# Patient Record
Sex: FEMALE | Race: WHITE | NOT HISPANIC OR LATINO | Employment: FULL TIME | URBAN - METROPOLITAN AREA
[De-identification: names, ages, dates, MRNs, and addresses within clinical notes are randomized per-mention and may not be internally consistent; named-entity substitution may affect disease eponyms.]

---

## 2017-07-10 ENCOUNTER — OFFICE VISIT (OUTPATIENT)
Dept: FAMILY MEDICINE | Facility: CLINIC | Age: 22
End: 2017-07-10
Payer: COMMERCIAL

## 2017-07-10 VITALS
HEART RATE: 90 BPM | HEIGHT: 65 IN | DIASTOLIC BLOOD PRESSURE: 73 MMHG | BODY MASS INDEX: 20.23 KG/M2 | SYSTOLIC BLOOD PRESSURE: 110 MMHG | OXYGEN SATURATION: 98 % | WEIGHT: 121.4 LBS | TEMPERATURE: 98.5 F

## 2017-07-10 DIAGNOSIS — Z11.3 SCREEN FOR STD (SEXUALLY TRANSMITTED DISEASE): ICD-10-CM

## 2017-07-10 DIAGNOSIS — Z30.011 OCP (ORAL CONTRACEPTIVE PILLS) INITIATION: ICD-10-CM

## 2017-07-10 DIAGNOSIS — Z00.00 ROUTINE GENERAL MEDICAL EXAMINATION AT A HEALTH CARE FACILITY: Primary | ICD-10-CM

## 2017-07-10 DIAGNOSIS — Z13.39 ATTENTION DEFICIT HYPERACTIVITY DISORDER EVALUATION: ICD-10-CM

## 2017-07-10 PROCEDURE — 87491 CHLMYD TRACH DNA AMP PROBE: CPT | Performed by: PHYSICIAN ASSISTANT

## 2017-07-10 PROCEDURE — 99213 OFFICE O/P EST LOW 20 MIN: CPT | Mod: 25 | Performed by: PHYSICIAN ASSISTANT

## 2017-07-10 PROCEDURE — 99395 PREV VISIT EST AGE 18-39: CPT | Performed by: PHYSICIAN ASSISTANT

## 2017-07-10 PROCEDURE — 87591 N.GONORRHOEAE DNA AMP PROB: CPT | Performed by: PHYSICIAN ASSISTANT

## 2017-07-10 RX ORDER — NORETHINDRONE ACETATE AND ETHINYL ESTRADIOL .02; 1 MG/1; MG/1
1 TABLET ORAL DAILY
Qty: 84 TABLET | Refills: 3 | Status: SHIPPED | OUTPATIENT
Start: 2017-07-10 | End: 2019-09-18

## 2017-07-10 NOTE — MR AVS SNAPSHOT
After Visit Summary   7/10/2017    Yue Carbone    MRN: [id number]           Patient Information     Date Of Birth          1995        Visit Information        Provider Department      7/10/2017 9:15 AM Aaseby-Aguilera, Ramona Ann, PA-C [facility]        Today's Diagnoses     Routine general medical examination at a health care facility    -  1    Attention deficit hyperactivity disorder evaluation        OCP (oral contraceptive pills) initiation        Screen for STD (sexually transmitted disease)          Care Instructions    ADHD eval:  Follow-up with [place] mental health for assessment then come back to me      The use of the oral contraceptive has been fully discussed with the patient. This includes the proper method to initiate (i.e. Sunday start after next normal menstrual onset) and continue the pills, the need for regular compliance to ensure adequate contraceptive effect, the physiology which make the pill effective, the instructions for what to do in event of a missed pill, and warnings about anticipated minor side effects such as breakthrough spotting, nausea, breast tenderness, weight changes, acne, headaches, etc.  The patient has been told of the more serious potential side effects such as MI, stroke, and deep vein thrombosis, all of which are very unlikely.  She has been asked to report any signs of such serious problems immediately.  She should back up the pill with a condom during any cycle in which antibiotics are prescribed, and during the first cycle as well.  She has been given written literature regarding use and side effects of oral contraceptives. The need for additional protection, such as a condom, to prevent exposure to sexually transmitted diseases has also been discussed- the patient has been clearly reminded that OCP's cannot protect her against diseases such as HIV and others. She understands and wishes to take the medication as  prescribed          Preventive Health Recommendations  Female Ages 18 to 25     Yearly exam:     See your health care provider every year in order to  o Review health changes.   o Discuss preventive care.    o Review your medicines if your doctor has prescribed any.      You should be tested each year for STDs (sexually transmitted diseases).       After age 20, talk to your provider about how often you should have cholesterol testing.      Starting at age 21, get a Pap test every three years. If you have an abnormal result, your doctor may have you test more often.      If you are at risk for diabetes, you should have a diabetes test (fasting glucose).     Shots:     Get a flu shot each year.     Get a tetanus shot every 10 years.     Consider getting the shot (vaccine) that prevents cervical cancer (Gardasil).    Nutrition:     Eat at least 5 servings of fruits and vegetables each day.    Eat whole-grain bread, whole-wheat pasta and brown rice instead of white grains and rice.    Talk to your provider about Calcium and Vitamin D.     Lifestyle    Exercise at least 150 minutes a week each week (30 minutes a day, 5 days a week). This will help you control your weight and prevent disease.    Limit alcohol to one drink per day.    No smoking.     Wear sunscreen to prevent skin cancer.    See your dentist every six months for an exam and cleaning.          Follow-ups after your visit        Additional Services     MENTAL HEALTH REFERRAL       Your provider has referred you to: [facility]: [facility] - ADHD & Bariatric Assessments - Adult ADHD Evaluations - [facility] - [place] [phone]   [email or web address]   *Please call to schedule an appointment.    All scheduling is subject to the client's specific insurance plan & benefits, provider/location availability, and provider clinical specialities.  Please arrive 15 minutes early for your first  "appointment and bring your completed paperwork.    Please be aware that coverage of these services is subject to the terms and limitations of your health insurance plan.  Call member services at your health plan with any benefit or coverage questions.                  Who to contact     If you have questions or need follow up information about today's clinic visit or your schedule please contact [facility] directly at [phone].  Normal or non-critical lab and imaging results will be communicated to you by MyChart, letter or phone within 4 business days after the clinic has received the results. If you do not hear from us within 7 days, please contact the clinic through [employer] or phone. If you have a critical or abnormal lab result, we will notify you by phone as soon as possible.  Submit refill requests through [employer] or call your pharmacy and they will forward the refill request to us. Please allow 3 business days for your refill to be completed.          Additional Information About Your Visit        [employer] Information     [employer] lets you send messages to your doctor, view your test results, renew your prescriptions, schedule appointments and more. To sign up, go to [email or web address] . Click on \"Log in\" on the left side of the screen, which will take you to the Welcome page. Then click on \"Sign up Now\" on the right side of the page.     You will be asked to enter the access code listed below, as well as some personal information. Please follow the directions to create your username and password.     Your access code is: [email or web address]  Expires: 10/8/2017  9:40 AM     Your access code will  in 90 days. If you need help or a new code, please call your [facility] or [phone].        Care EveryWhere ID     This is your Care EveryWhere ID. This could be used by other organizations to access your [facility] medical records  [id number]        Your Vitals Were     Pulse " "Temperature Height Last Period Pulse Oximetry BMI (Body Mass Index)    90 98.5  F (36.9  C) (Oral) 5' 5\" (1.651 m) 07/10/2017 98% 20.2 kg/m2       Blood Pressure from Last 3 Encounters:   07/10/17 110/73   06/24/16 109/70   03/15/16 98/60    Weight from Last 3 Encounters:   07/10/17 121 lb 6.4 oz (55.1 kg)   06/24/16 120 lb (54.4 kg) (36 %)*   03/15/16 119 lb 4.8 oz (54.1 kg) (35 %)*     * Growth percentiles are based on [facility] 2-20 Years data.              We Performed the Following     CHLAMYDIA TRACHOMATIS PCR     MENTAL HEALTH REFERRAL     NEISSERIA GONORRHOEA PCR          Today's Medication Changes          These changes are accurate as of: 7/10/17  9:40 AM.  If you have any questions, ask your nurse or doctor.               Start taking these medicines.        Dose/Directions    norethindrone-ethinyl estradiol 1-20 MG-MCG per tablet   Commonly known as:  MICROGESTIN 1/20   Used for:  OCP (oral contraceptive pills) initiation   Started by:  Aaseby-Aguilera, Ramona Ann, PA-C        Dose:  1 tablet   Take 1 tablet by mouth daily   Quantity:  84 tablet   Refills:  3            Where to get your medicines      These medications were sent to [facility]/pharmacy #[id number] - [place], [place] - [address] [address] [facility]  [address] [address] [address]    Hours:  [facility] Phone:  [phone]     norethindrone-ethinyl estradiol 1-20 MG-MCG per tablet                Primary Care Provider Office Phone # Fax #    Naz Catherine [phone]       [facility] [address]  [facility] [address]        Equal Access to Services     SONIA ORDONEZ AH: Heide Amaya, wadanelle luidris, qaybta kaalmonae hickey, nirav bonner. So [facility] [phone].    ATENCIÓN: Si habla español, tiene a mccoy disposición servicios gratuitos de asistencia lingüística. Llame al [phone].    We comply with applicable federal civil rights laws and [place] laws. We " do not discriminate on the basis of race, color, national origin, age, disability sex, sexual orientation or gender identity.            Thank you!     Thank you for choosing [facility]  for your care. Our goal is always to provide you with excellent care. Hearing back from our patients is one way we can continue to improve our services. Please take a few minutes to complete the written survey that you may receive in the mail after your visit with us. Thank you!             Your Updated Medication List - Protect others around you: Learn how to safely use, store and throw away your medicines at www.disposemymeds.org.          This list is accurate as of: 7/10/17  9:40 AM.  Always use your most recent med list.                   Brand Name Dispense Instructions for use Diagnosis    norethindrone-ethinyl estradiol 1-20 MG-MCG per tablet    MICROGESTIN 1/20    84 tablet    Take 1 tablet by mouth daily    OCP (oral contraceptive pills) initiation

## 2017-07-10 NOTE — PROGRESS NOTES
SUBJECTIVE:   CC: Yue Carbone is an 22 year old woman who presents for preventive health visit.     Healthy Habits:    Do you get at least three servings of calcium containing foods daily (dairy, green leafy vegetables, etc.)? sometimes    Amount of exercise or daily activities, outside of work: none    Problems taking medications regularly not applicable    Medication side effects: No    Have you had an eye exam in the past two years? yes    Do you see a dentist twice per year? yes    Do you have sleep apnea, excessive snoring or daytime drowsiness?no        Discuss birth control /  Wants to get tested for add     Today's PHQ-2 Score:   PHQ-2 ( 1999 [employer]) 1/13/2016 1/13/2016   Q1: Little interest or pleasure in doing things 0 0   Q2: Feeling down, depressed or hopeless 0 0   PHQ-2 Score 0 0       Abuse: Current or Past(Physical, Sexual or Emotional)- No  Do you feel safe in your environment - Yes    Social History   Substance Use Topics     Smoking status: Never Smoker     Smokeless tobacco: Never Used     Alcohol use 0.0 oz/week     0 Standard drinks or equivalent per week      Comment: occ wine     social     Reviewed orders with patient.  Reviewed health maintenance and updated orders accordingly - Yes  Labs reviewed in [facility]    Mammogram not appropriate for this patient based on age.    Pertinent mammograms are reviewed under the imaging tab.  History of abnormal Pap smear: NO - under age 21, PAP not appropriate for age    Reviewed and updated as needed this visit by clinical staff  Tobacco  Allergies  Meds  Med Hx  Surg Hx  Fam Hx  Soc Hx        Reviewed and updated as needed this visit by Provider              ROS:  C: NEGATIVE for fever, chills, change in weight  I: NEGATIVE for worrisome rashes, moles or lesions  E: NEGATIVE for vision changes or irritation  ENT: NEGATIVE for ear, mouth and throat problems  R: NEGATIVE for significant cough or SOB  B: NEGATIVE for masses, tenderness or  "discharge  CV: NEGATIVE for chest pain, palpitations or peripheral edema  GI: NEGATIVE for nausea, abdominal pain, heartburn, or change in bowel habits  : NEGATIVE for unusual urinary or vaginal symptoms. Periods are regular.  M: NEGATIVE for significant arthralgias or myalgia  N: NEGATIVE for weakness, dizziness or paresthesias  P: NEGATIVE for changes in mood or affect    OBJECTIVE:   /73 (BP Location: Right arm, Patient Position: Chair, Cuff Size: Adult Regular)  Pulse 90  Temp 98.5  F (36.9  C) (Oral)  Ht 5' 5\" (1.651 m)  Wt 121 lb 6.4 oz (55.1 kg)  LMP 07/10/2017  SpO2 98%  BMI 20.2 kg/m2  EXAM:  GENERAL: healthy, alert and no distress  EYES: Eyes grossly normal to inspection, PERRL and conjunctivae and sclerae normal  HENT: ear canals and TM's normal, nose and mouth without ulcers or lesions  NECK: no adenopathy, no asymmetry, masses, or scars and thyroid normal to palpation  RESP: lungs clear to auscultation - no rales, rhonchi or wheezes  BREAST: normal without masses, tenderness or nipple discharge and no palpable axillary masses or adenopathy  CV: regular rate and rhythm, normal S1 S2, no S3 or S4, no murmur, click or rub, no peripheral edema and peripheral pulses strong  ABDOMEN: soft, nontender, no hepatosplenomegaly, no masses and bowel sounds normal  MS: no gross musculoskeletal defects noted, no edema  SKIN: no suspicious lesions or rashes  NEURO: Normal strength and tone, mentation intact and speech normal  PSYCH: mentation appears normal, affect normal/bright    ASSESSMENT/PLAN:   1. Routine general medical examination at a health care facility      2. Attention deficit hyperactivity disorder evaluation  Yue states she would like to discuss ADHD symptoims.  She has always had a difficult time focusing and high school was hard for her. Is going back to college this fall and needs help. Well refer her to Mental health for  Evaluation.  Signs and symptoms discussed as well as various " "medications and treatment  - MENTAL HEALTH REFERRAL    3. OCP (oral contraceptive pills) initiation  The use of the oral contraceptive has been fully discussed with the patient. This includes the proper method to initiate (i.e. Sunday start after next normal menstrual onset) and continue the pills, the need for regular compliance to ensure adequate contraceptive effect, the physiology which make the pill effective, the instructions for what to do in event of a missed pill, and warnings about anticipated minor side effects such as breakthrough spotting, nausea, breast tenderness, weight changes, acne, headaches, etc.  The patient has been told of the more serious potential side effects such as MI, stroke, and deep vein thrombosis, all of which are very unlikely.  She has been asked to report any signs of such serious problems immediately.  She should back up the pill with a condom during any cycle in which antibiotics are prescribed, and during the first cycle as well.    - norethindrone-ethinyl estradiol (MICROGESTIN 1/20) 1-20 MG-MCG per tablet; Take 1 tablet by mouth daily  Dispense: 84 tablet; Refill: 3    4. Screen for STD (sexually transmitted disease)    - NEISSERIA GONORRHOEA PCR  - CHLAMYDIA TRACHOMATIS PCR    COUNSELING:   Reviewed preventive health counseling, as reflected in patient instructions       Regular exercise       Healthy diet/nutrition       Contraception       Safe sex practices/STD prevention    BP Screening:   Last 3 BP Readings:    BP Readings from Last 3 Encounters:   07/10/17 110/73   06/24/16 109/70   03/15/16 98/60          reports that she has never smoked. She has never used smokeless tobacco.    Estimated body mass index is 20.2 kg/(m^2) as calculated from the following:    Height as of this encounter: 5' 5\" (1.651 m).    Weight as of this encounter: 121 lb 6.4 oz (55.1 kg).         Counseling Resources:  ATP IV Guidelines  Pooled Cohorts Equation Calculator  Breast Cancer Risk " Calculator  FRAX Risk Assessment  ICSI Preventive Guidelines  Dietary Guidelines for Americans, 2010  [employer]'s MyPlate  ASA Prophylaxis  Lung CA Screening    Ramona Ann Aaseby-Aguilera, PA-C    Patient Instructions   MITESH samson:  Follow-up with [place] mental [facility] for assessment then come back to me      The use of the oral contraceptive has been fully discussed with the patient. This includes the proper method to initiate (i.e. Sunday start after next normal menstrual onset) and continue the pills, the need for regular compliance to ensure adequate contraceptive effect, the physiology which make the pill effective, the instructions for what to do in event of a missed pill, and warnings about anticipated minor side effects such as breakthrough spotting, nausea, breast tenderness, weight changes, acne, headaches, etc.  The patient has been told of the more serious potential side effects such as MI, stroke, and deep vein thrombosis, all of which are very unlikely.  She has been asked to report any signs of such serious problems immediately.  She should back up the pill with a condom during any cycle in which antibiotics are prescribed, and during the first cycle as well.  She has been given written literature regarding use and side effects of oral contraceptives. The need for additional protection, such as a condom, to prevent exposure to sexually transmitted diseases has also been discussed- the patient has been clearly reminded that OCP's cannot protect her against diseases such as HIV and others. She understands and wishes to take the medication as prescribed          Preventive Health Recommendations  Female Ages 18 to 25     Yearly exam:     See your health care provider every year in order to  o Review health changes.   o Discuss preventive care.    o Review your medicines if your doctor has prescribed any.      You should be tested each year for STDs (sexually transmitted diseases).       After age 20, talk to your  provider about how often you should have cholesterol testing.      Starting at age 21, get a Pap test every three years. If you have an abnormal result, your doctor may have you test more often.      If you are at risk for diabetes, you should have a diabetes test (fasting glucose).     Shots:     Get a flu shot each year.     Get a tetanus shot every 10 years.     Consider getting the shot (vaccine) that prevents cervical cancer (Gardasil).    Nutrition:     Eat at least 5 servings of fruits and vegetables each day.    Eat whole-grain bread, whole-wheat pasta and brown rice instead of white grains and rice.    Talk to your provider about Calcium and Vitamin D.     Lifestyle    Exercise at least 150 minutes a week each week (30 minutes a day, 5 days a week). This will help you control your weight and prevent disease.    Limit alcohol to one drink per day.    No smoking.     Wear sunscreen to prevent skin cancer.    See your dentist every six months for an exam and cleaning.    [facility]

## 2017-07-10 NOTE — NURSING NOTE
"Chief Complaint   Patient presents with     Physical       Initial /73 (BP Location: Right arm, Patient Position: Chair, Cuff Size: Adult Regular)  Pulse 90  Temp 98.5  F (36.9  C) (Oral)  Ht 5' 5\" (1.651 m)  Wt 121 lb 6.4 oz (55.1 kg)  LMP 07/10/2017  SpO2 98%  BMI 20.2 kg/m2 Estimated body mass index is 20.2 kg/(m^2) as calculated from the following:    Height as of this encounter: 5' 5\" (1.651 m).    Weight as of this encounter: 121 lb 6.4 oz (55.1 kg).  Medication Reconciliation: complete Nilda Velásquez CMA        "

## 2017-07-10 NOTE — PATIENT INSTRUCTIONS
ADHD lynnal:  Follow-up with [place] mental [facility] for assessment then come back to me      The use of the oral contraceptive has been fully discussed with the patient. This includes the proper method to initiate (i.e. Sunday start after next normal menstrual onset) and continue the pills, the need for regular compliance to ensure adequate contraceptive effect, the physiology which make the pill effective, the instructions for what to do in event of a missed pill, and warnings about anticipated minor side effects such as breakthrough spotting, nausea, breast tenderness, weight changes, acne, headaches, etc.  The patient has been told of the more serious potential side effects such as MI, stroke, and deep vein thrombosis, all of which are very unlikely.  She has been asked to report any signs of such serious problems immediately.  She should back up the pill with a condom during any cycle in which antibiotics are prescribed, and during the first cycle as well.  She has been given written literature regarding use and side effects of oral contraceptives. The need for additional protection, such as a condom, to prevent exposure to sexually transmitted diseases has also been discussed- the patient has been clearly reminded that OCP's cannot protect her against diseases such as HIV and others. She understands and wishes to take the medication as prescribed          Preventive Health Recommendations  Female Ages 18 to 25     Yearly exam:     See your health care provider every year in order to  o Review health changes.   o Discuss preventive care.    o Review your medicines if your doctor has prescribed any.      You should be tested each year for STDs (sexually transmitted diseases).       After age 20, talk to your provider about how often you should have cholesterol testing.      Starting at age 21, get a Pap test every three years. If you have an abnormal result, your doctor may have you test more often.      If you are at  risk for diabetes, you should have a diabetes test (fasting glucose).     Shots:     Get a flu shot each year.     Get a tetanus shot every 10 years.     Consider getting the shot (vaccine) that prevents cervical cancer (Gardasil).    Nutrition:     Eat at least 5 servings of fruits and vegetables each day.    Eat whole-grain bread, whole-wheat pasta and brown rice instead of white grains and rice.    Talk to your provider about Calcium and Vitamin D.     Lifestyle    Exercise at least 150 minutes a week each week (30 minutes a day, 5 days a week). This will help you control your weight and prevent disease.    Limit alcohol to one drink per day.    No smoking.     Wear sunscreen to prevent skin cancer.    See your dentist every six months for an exam and cleaning.

## 2017-07-11 ENCOUNTER — TELEPHONE (OUTPATIENT)
Dept: FAMILY MEDICINE | Facility: CLINIC | Age: 22
End: 2017-07-11

## 2017-07-11 DIAGNOSIS — A74.9 CHLAMYDIA INFECTION: Primary | ICD-10-CM

## 2017-07-11 LAB
C TRACH DNA SPEC QL NAA+PROBE: ABNORMAL
N GONORRHOEA DNA SPEC QL NAA+PROBE: NORMAL
SPECIMEN SOURCE: ABNORMAL
SPECIMEN SOURCE: NORMAL

## 2017-07-11 RX ORDER — AZITHROMYCIN 500 MG/1
1000 TABLET, FILM COATED ORAL ONCE
Qty: 2 TABLET | Refills: 0 | Status: SHIPPED | OUTPATIENT
Start: 2017-07-11 | End: 2017-07-11

## 2017-07-11 NOTE — TELEPHONE ENCOUNTER
"Pt is very surprised by results of test -  States she has only been with one person for the last year and has not been sexually active for the last several months.     Pt does report \"having more sex before I met my boyfriend\" and now worries about possible other STD's     Please advise as to other STD testing and RX for Chlamydia    Pt advised can have injection without symptoms.   Needs to notify sexual partners.   Must abstain from sex for full 7 days until both are treated.   Should practice safe sex/condoms at all times.       MDH reporting done.     Per PCP send RX zithromax-  Pt should schedule OV to discuss further STD testing.     Pt expressed understanding and acceptance of the plan.  Pt had no further questions at this time.  Advised can call back to clinic at any time with concerns.       Message handled by Nurse Triage with [facility] - provider name: Ramona Aaseby Aguliea PA.    Sienna Taylor RN    "

## 2018-04-28 ENCOUNTER — HEALTH MAINTENANCE LETTER (OUTPATIENT)
Age: 23
End: 2018-04-28

## 2018-07-28 ENCOUNTER — HEALTH MAINTENANCE LETTER (OUTPATIENT)
Age: 23
End: 2018-07-28

## 2018-08-18 ENCOUNTER — HEALTH MAINTENANCE LETTER (OUTPATIENT)
Age: 23
End: 2018-08-18

## 2019-09-18 ENCOUNTER — OFFICE VISIT (OUTPATIENT)
Dept: FAMILY MEDICINE | Facility: CLINIC | Age: 24
End: 2019-09-18
Payer: COMMERCIAL

## 2019-09-18 VITALS
SYSTOLIC BLOOD PRESSURE: 116 MMHG | HEIGHT: 55 IN | RESPIRATION RATE: 16 BRPM | OXYGEN SATURATION: 99 % | BODY MASS INDEX: 26.04 KG/M2 | WEIGHT: 112.5 LBS | TEMPERATURE: 98.1 F | HEART RATE: 73 BPM | DIASTOLIC BLOOD PRESSURE: 72 MMHG

## 2019-09-18 DIAGNOSIS — F32.9 REACTIVE DEPRESSION: ICD-10-CM

## 2019-09-18 DIAGNOSIS — F41.1 GAD (GENERALIZED ANXIETY DISORDER): Primary | ICD-10-CM

## 2019-09-18 PROCEDURE — 99214 OFFICE O/P EST MOD 30 MIN: CPT | Performed by: PHYSICIAN ASSISTANT

## 2019-09-18 RX ORDER — CITALOPRAM HYDROBROMIDE 10 MG/1
10 TABLET ORAL DAILY
Qty: 60 TABLET | Refills: 1 | Status: SHIPPED | OUTPATIENT
Start: 2019-09-18 | End: 2019-10-29

## 2019-09-18 ASSESSMENT — ANXIETY QUESTIONNAIRES
6. BECOMING EASILY ANNOYED OR IRRITABLE: NEARLY EVERY DAY
7. FEELING AFRAID AS IF SOMETHING AWFUL MIGHT HAPPEN: MORE THAN HALF THE DAYS
IF YOU CHECKED OFF ANY PROBLEMS ON THIS QUESTIONNAIRE, HOW DIFFICULT HAVE THESE PROBLEMS MADE IT FOR YOU TO DO YOUR WORK, TAKE CARE OF THINGS AT HOME, OR GET ALONG WITH OTHER PEOPLE: VERY DIFFICULT
1. FEELING NERVOUS, ANXIOUS, OR ON EDGE: NEARLY EVERY DAY
2. NOT BEING ABLE TO STOP OR CONTROL WORRYING: NEARLY EVERY DAY
GAD7 TOTAL SCORE: 20
3. WORRYING TOO MUCH ABOUT DIFFERENT THINGS: NEARLY EVERY DAY
5. BEING SO RESTLESS THAT IT IS HARD TO SIT STILL: NEARLY EVERY DAY

## 2019-09-18 ASSESSMENT — MIFFLIN-ST. JEOR: SCORE: 1088.61

## 2019-09-18 ASSESSMENT — PATIENT HEALTH QUESTIONNAIRE - PHQ9
SUM OF ALL RESPONSES TO PHQ QUESTIONS 1-9: 19
5. POOR APPETITE OR OVEREATING: NEARLY EVERY DAY

## 2019-09-18 NOTE — PROGRESS NOTES
Subjective     Yue Carbone is a 24 year old female who presents to clinic today for the following health issues:    HPI   Abnormal Mood Symptoms  Onset: 2 weeks     Description:   Depression: YES  Anxiety: YES    Accompanying Signs & Symptoms:  Still participating in activities that you used to enjoy: YES  Fatigue: YES  Irritability: yes  Difficulty concentrating: YES  Changes in appetite: YES- eating less  Problems with sleep: YES- hard sleeping   Heart racing/beating fast : YES  Thoughts of hurting yourself or others: none    History:   Recent stress: YES- moved out state   Prior depression hospitalization: None  Family history of depression: no   Family history of anxiety: YES    Precipitating factors:   Alcohol/drug use: some drinks wine     The patient just moved from [place] due to trouble with her last job. She notes she felt like she had to move back to [place] due to this. She has a boyfriend who still lives in [place]. She notes since moving back she has had increased anxiety and depression. She has had several panic attacks per mother. The patient is starting a new job this week which she is excited about.   She is currently living with roommates and her cat, whom she loves.    Patient notes she is constantly anxious and irritable. She is having trouble falling asleep and wakes throughout the night. She denies any suicidal ideation.    PHQ-9: 19  BRADLEY-7: 20    There is no problem list on file for this patient.      No current outpatient medications on file.     No current facility-administered medications for this visit.        Allergies   Allergen Reactions     Seasonal Allergies        Reviewed and updated as needed this visit by Provider  Tobacco  Allergies  Meds  Problems         Review of Systems   GENERAL:  No fevers  PSYCH: As noted in HPI      Objective    /72 (BP Location: Right arm, Patient Position: Chair, Cuff Size: Adult Regular)   Pulse 73   Temp 98.1  F (36.7  C)   Resp 16   " Ht 1.359 m (4' 5.5\")   Wt 51 kg (112 lb 8 oz)   SpO2 99%   BMI 27.63 kg/m    Body mass index is 27.63 kg/m .  Physical Exam   GENERAL: No acute distress  HEENT: Normocephalic  NEURO: Alert and non-focal  PSYCH: Anxious affect, irritable        Assessment & Plan   1. BRADLEY (generalized anxiety disorder)  Patient's mother has been on Celexa previously. Patient has never been on medications for anxiety/depression. Mother has tolerated the Celexa well. At this time patient would like to forgo therapy (she notes when she has done therapy in the past she had a difficult time talking).   We discussed common side effects of Celexa. Given the degree of her anxiety and depression I did opt to increase the dose to 20 mg within a week. Follow up in 1 month. Patient will schedule this at a later date, likely in [place] (closer to where she lives).  - citalopram (CELEXA) 10 MG tablet; Take 1 tablet (10 mg) by mouth daily Increase to 2 tablets daily there after  Dispense: 60 tablet; Refill: 1    2. Reactive depression  As noted above.  - citalopram (CELEXA) 10 MG tablet; Take 1 tablet (10 mg) by mouth daily Increase to 2 tablets daily there after  Dispense: 60 tablet; Refill: 1    Patient Instructions   Follow up in 1 month.     Take Celexa as prescribed.      Return in about 1 month (around 10/18/2019).    Daniela Mcnair PA-C  [facility]"

## 2019-09-19 ASSESSMENT — ANXIETY QUESTIONNAIRES: GAD7 TOTAL SCORE: 20

## 2019-10-29 ENCOUNTER — OFFICE VISIT (OUTPATIENT)
Dept: FAMILY MEDICINE | Facility: CLINIC | Age: 24
End: 2019-10-29
Payer: COMMERCIAL

## 2019-10-29 VITALS
DIASTOLIC BLOOD PRESSURE: 86 MMHG | RESPIRATION RATE: 16 BRPM | BODY MASS INDEX: 27.02 KG/M2 | SYSTOLIC BLOOD PRESSURE: 127 MMHG | OXYGEN SATURATION: 99 % | TEMPERATURE: 98.6 F | WEIGHT: 110 LBS | HEART RATE: 96 BPM

## 2019-10-29 DIAGNOSIS — F41.1 GAD (GENERALIZED ANXIETY DISORDER): Primary | ICD-10-CM

## 2019-10-29 DIAGNOSIS — F32.9 REACTIVE DEPRESSION: ICD-10-CM

## 2019-10-29 PROCEDURE — 99214 OFFICE O/P EST MOD 30 MIN: CPT | Performed by: PHYSICIAN ASSISTANT

## 2019-10-29 RX ORDER — LORAZEPAM 0.5 MG/1
0.5 TABLET ORAL 2 TIMES DAILY PRN
Qty: 10 TABLET | Refills: 1 | Status: SHIPPED | OUTPATIENT
Start: 2019-10-29 | End: 2019-12-30

## 2019-10-29 RX ORDER — DESVENLAFAXINE 25 MG/1
TABLET, EXTENDED RELEASE ORAL
Qty: 60 TABLET | Refills: 1 | Status: SHIPPED | OUTPATIENT
Start: 2019-10-29 | End: 2019-12-21

## 2019-10-29 ASSESSMENT — ANXIETY QUESTIONNAIRES
2. NOT BEING ABLE TO STOP OR CONTROL WORRYING: NEARLY EVERY DAY
7. FEELING AFRAID AS IF SOMETHING AWFUL MIGHT HAPPEN: MORE THAN HALF THE DAYS
1. FEELING NERVOUS, ANXIOUS, OR ON EDGE: NEARLY EVERY DAY
IF YOU CHECKED OFF ANY PROBLEMS ON THIS QUESTIONNAIRE, HOW DIFFICULT HAVE THESE PROBLEMS MADE IT FOR YOU TO DO YOUR WORK, TAKE CARE OF THINGS AT HOME, OR GET ALONG WITH OTHER PEOPLE: EXTREMELY DIFFICULT
6. BECOMING EASILY ANNOYED OR IRRITABLE: NEARLY EVERY DAY
3. WORRYING TOO MUCH ABOUT DIFFERENT THINGS: NEARLY EVERY DAY
5. BEING SO RESTLESS THAT IT IS HARD TO SIT STILL: NEARLY EVERY DAY
GAD7 TOTAL SCORE: 20

## 2019-10-29 ASSESSMENT — PATIENT HEALTH QUESTIONNAIRE - PHQ9
SUM OF ALL RESPONSES TO PHQ QUESTIONS 1-9: 21
5. POOR APPETITE OR OVEREATING: NEARLY EVERY DAY

## 2019-10-29 NOTE — PROGRESS NOTES
Subjective     Yue Carbone is a 24 year old female who presents to clinic today for the following health issues:    HPI   Anxiety Follow-Up    How are you doing with your anxiety since your last visit? Worsened loss of appitate and more anxious     Are you having other symptoms that might be associated with anxiety? Yes:  panic attack     Have you had a significant life event? No     Are you feeling depressed? Yes:  worsening    Do you have any concerns with your use of alcohol or other drugs? No    Yue is a 24-year-old female who presents today for follow-up regarding anxiety and depression.  The patient was seen approximately 1 month ago and started on Celexa.  She has since stopped the medication about a month ago due to decreased appetite and increased anxiety.  Patient notes she has had increased irritability.  She is having some tension with her roommates.  Currently she is enjoying her job but is commuting quite far.    Of note patient does indicate she has had some passive thoughts of suicidal ideation.  She denies any plan.  She notes after stopping the Celexa she felt very down and was having trouble seeing beyond the days that were bad days.  This has improved recently.  The patient notes she is still very close with her boyfriend in [place] and calls him frequently.  She is also close with her mother who she feels comfortable reaching out to.    Patient has had more anxiety attacks and panic attacks since stopping the Celexa as well.    Social History     Tobacco Use     Smoking status: Never Smoker     Smokeless tobacco: Never Used   Substance Use Topics     Alcohol use: Yes     Alcohol/week: 0.0 standard drinks     Comment: occ wine     Drug use: No     PHQ-9: 21  BRADLEY-7: 20    Reviewed and updated as needed this visit by Provider  Tobacco  Allergies  Meds  Problems         Review of Systems   GENERAL:  No fevers  PSYCH: As noted in HPI        Objective    /86 (BP Location: Right arm,  Patient Position: Chair, Cuff Size: Adult Regular)   Pulse 96   Temp 98.6  F (37  C) (Oral)   Resp 16   Wt 49.9 kg (110 lb)   SpO2 99%   BMI 27.02 kg/m    Body mass index is 27.02 kg/m .  Physical Exam   GENERAL: No acute distress  HEENT: Normocephalic  NEURO: Alert and non-focal          Assessment & Plan     1. BRADLEY (generalized anxiety disorder)  Patient is still interested in medication options.  We discussed the numerous options available however she has been on Zoloft previously and did not like side effects.  I am concerned fluoxetine may worsen her anxiety.  She did not tolerate the Celexa and thus I do not feel Lexapro is a good option.  Effexor is not preferred by her insurance and thus patient started on Pristiq instead.  I recommended she start at 25 mg daily and increase to 50 mg after 1 week.  She is requesting an as needed medication for panic attacks.  She was prescribed lorazepam 0.5 mg to use up to twice daily as needed.  I did caution patient regarding the addictive nature of this medication and recommended she not use it more than twice weekly.  I also recommended she not drink alcohol with the Ativan.  I do believe patient would benefit from therapy to help with coping mechanisms for anxiety and to decrease her frequency of panic attacks.  She was in agreement to do this.  Referral placed for her today.  Again patient was having passive thoughts of suicidal ideation.  She denies having a plan.  I recommended she reach out if she begins to have more thoughts or concerns for her safety.  - MENTAL HEALTH REFERRAL  - Adult; Outpatient Treatment; Individual/Couples/Family/Group Therapy/Health Psychology; Other: [facility] [phone]; We will contact you to schedule the appointment or please call with any questi...  - desvenlafaxine (PRISTIQ) 25 MG 24 hr tablet; 1 tablet daily for 1 week and increase to 2 tablets daily thereafter  Dispense: 60 tablet; Refill: 1  -  LORazepam (ATIVAN) 0.5 MG tablet; Take 1 tablet (0.5 mg) by mouth 2 times daily as needed for anxiety  Dispense: 10 tablet; Refill: 1    2. Reactive depression  As noted above.  - MENTAL HEALTH REFERRAL  - Adult; Outpatient Treatment; Individual/Couples/Family/Group Therapy/Health Psychology; Other: [facility] [phone]; We will contact you to schedule the appointment or please call with any questi...  - desvenlafaxine (PRISTIQ) 25 MG 24 hr tablet; 1 tablet daily for 1 week and increase to 2 tablets daily thereafter  Dispense: 60 tablet; Refill: 1    Return in about 1 month (around 11/29/2019) for for follow up anxiety/depression.    30 minutes. Greater than 50% of the time was spent face to face counseling regarding medical conditions and treatment options as described above.     Daniela Mcnair PA-C  [facility]

## 2019-10-30 ASSESSMENT — ANXIETY QUESTIONNAIRES: GAD7 TOTAL SCORE: 20

## 2019-12-02 ENCOUNTER — TELEPHONE (OUTPATIENT)
Dept: FAMILY MEDICINE | Facility: CLINIC | Age: 24
End: 2019-12-02

## 2019-12-02 NOTE — TELEPHONE ENCOUNTER
Summary:    Patient is due/failing the following:   chlamydia and PAP    Reviewed:  [x] CARE EVERYWHERE  [x] LAST OV NOTE INCLUDING ENDO  [x] FYI TAB  [x] MYCHART ACTIVE?  [x] LAST PANEL ENCOUNTER  [x] FUTURE APPTS  [x] IMMUNIZATIONS          Action needed:   Patient needs office visit for pap.    Type of outreach:    pt has future appt on 12/05/2019 with Dr. WILTON Vaca/[facility]

## 2019-12-21 ENCOUNTER — OFFICE VISIT (OUTPATIENT)
Dept: FAMILY MEDICINE | Facility: CLINIC | Age: 24
End: 2019-12-21
Payer: COMMERCIAL

## 2019-12-21 VITALS
DIASTOLIC BLOOD PRESSURE: 67 MMHG | TEMPERATURE: 98.3 F | RESPIRATION RATE: 16 BRPM | HEART RATE: 92 BPM | BODY MASS INDEX: 28.25 KG/M2 | SYSTOLIC BLOOD PRESSURE: 100 MMHG | WEIGHT: 115 LBS | OXYGEN SATURATION: 99 %

## 2019-12-21 DIAGNOSIS — F32.9 REACTIVE DEPRESSION: ICD-10-CM

## 2019-12-21 DIAGNOSIS — F41.1 GAD (GENERALIZED ANXIETY DISORDER): ICD-10-CM

## 2019-12-21 PROCEDURE — 99214 OFFICE O/P EST MOD 30 MIN: CPT | Performed by: PHYSICIAN ASSISTANT

## 2019-12-21 RX ORDER — DESVENLAFAXINE 25 MG/1
75 TABLET, EXTENDED RELEASE ORAL DAILY
Qty: 90 TABLET | Refills: 1 | Status: SHIPPED | OUTPATIENT
Start: 2019-12-21 | End: 2020-12-14

## 2019-12-21 ASSESSMENT — ANXIETY QUESTIONNAIRES
GAD7 TOTAL SCORE: 20
1. FEELING NERVOUS, ANXIOUS, OR ON EDGE: NEARLY EVERY DAY
3. WORRYING TOO MUCH ABOUT DIFFERENT THINGS: NEARLY EVERY DAY
5. BEING SO RESTLESS THAT IT IS HARD TO SIT STILL: NEARLY EVERY DAY
7. FEELING AFRAID AS IF SOMETHING AWFUL MIGHT HAPPEN: MORE THAN HALF THE DAYS
GAD7 TOTAL SCORE: 20
2. NOT BEING ABLE TO STOP OR CONTROL WORRYING: NEARLY EVERY DAY
4. TROUBLE RELAXING: NEARLY EVERY DAY
GAD7 TOTAL SCORE: 20
6. BECOMING EASILY ANNOYED OR IRRITABLE: NEARLY EVERY DAY
7. FEELING AFRAID AS IF SOMETHING AWFUL MIGHT HAPPEN: MORE THAN HALF THE DAYS

## 2019-12-21 ASSESSMENT — PATIENT HEALTH QUESTIONNAIRE - PHQ9
SUM OF ALL RESPONSES TO PHQ QUESTIONS 1-9: 21
SUM OF ALL RESPONSES TO PHQ QUESTIONS 1-9: 21
10. IF YOU CHECKED OFF ANY PROBLEMS, HOW DIFFICULT HAVE THESE PROBLEMS MADE IT FOR YOU TO DO YOUR WORK, TAKE CARE OF THINGS AT HOME, OR GET ALONG WITH OTHER PEOPLE: VERY DIFFICULT

## 2019-12-21 NOTE — PROGRESS NOTES
Subjective     Yue Carbone is a 24 year old female who presents to clinic today for the following health issues:    History of Present Illness        Mental Health Follow-up:  Patient presents to follow-up on Depression & Anxiety.Patient's depression since last visit has been:  Medium  The patient is having other symptoms associated with depression.  Patient's anxiety since last visit has been:  No change  The patient is having other symptoms associated with anxiety.  Any significant life events: other  Patient is feeling anxious or having panic attacks.  Patient has no concerns about alcohol or drug use.     Social History  Tobacco Use    Smoking status: Never Smoker    Smokeless tobacco: Never Used  Alcohol use: Yes    Alcohol/week: 0.0 standard drinks    Comment: occ wine  Drug use: No      Today's PHQ-9         PHQ-9 Total Score:     (P) 21   PHQ-9 Q9 Thoughts of better off dead/self-harm past 2 weeks :   (P) Not at all   Thoughts of suicide or self harm:      Self-harm Plan:        Self-harm Action:          Safety concerns for self or others:                  Current Outpatient Medications   Medication Sig Dispense Refill     desvenlafaxine succinate (PRISTIQ) 25 MG 24 hr tablet Take 3 tablets (75 mg) by mouth daily 90 tablet 1     LORazepam (ATIVAN) 0.5 MG tablet Take 1 tablet (0.5 mg) by mouth 2 times daily as needed for anxiety 10 tablet 1     No lab results found.   BP Readings from Last 3 Encounters:   12/21/19 100/67   10/29/19 127/86   09/18/19 116/72    Wt Readings from Last 3 Encounters:   12/21/19 52.2 kg (115 lb)   10/29/19 49.9 kg (110 lb)   09/18/19 51 kg (112 lb 8 oz)                    Reviewed and updated as needed this visit by Provider         Review of Systems   ROS COMP: Constitutional, HEENT, cardiovascular, pulmonary, gi and gu systems are negative, except as otherwise noted.      Objective    /67 (BP Location: Right arm, Patient Position: Sitting, Cuff Size: Adult Regular)   " Pulse 92   Temp 98.3  F (36.8  C) (Oral)   Resp 16   Wt 52.2 kg (115 lb)   SpO2 99%   BMI 28.25 kg/m    Body mass index is 28.25 kg/m .  Physical Exam   GENERAL: healthy, alert and no distress  HENT: ear canals and TM's normal, nose and mouth without ulcers or lesions  RESP: lungs clear to auscultation - no rales, rhonchi or wheezes  CV: regular rate and rhythm, normal S1 S2, no S3 or S4, no murmur, click or rub, no peripheral edema and peripheral pulses strong  SKIN: no suspicious lesions or rashes    Diagnostic Test Results:  Labs reviewed in Epic        Assessment & Plan     1. BRADLEY (generalized anxiety disorder)  Has been stable on this for a while.  Needs refills   - desvenlafaxine succinate (PRISTIQ) 25 MG 24 hr tablet; Take 3 tablets (75 mg) by mouth daily  Dispense: 90 tablet; Refill: 1    2. Reactive depression    - desvenlafaxine succinate (PRISTIQ) 25 MG 24 hr tablet; Take 3 tablets (75 mg) by mouth daily  Dispense: 90 tablet; Refill: 1     BMI:   Estimated body mass index is 28.25 kg/m  as calculated from the following:    Height as of 9/18/19: 1.359 m (4' 5.5\").    Weight as of this encounter: 52.2 kg (115 lb).           Patient Instructions   (F41.1) BRADLEY (generalized anxiety disorder)  Comment:   Plan: desvenlafaxine succinate (PRISTIQ) 25 MG 24 hr         tablet          (F32.9) Reactive depression  Comment:   Plan: desvenlafaxine succinate (PRISTIQ) 25 MG 24 hr         tablet      Was started on proistiq 2 months ago.  States she likes it due to not having significant side effects.  Has been taking 50 mg daily.  Still has significant  Anxiety and depression.  Well increase to 75 mg daily and advised to follow-up in 1 month     Recommend getting thyroid tested but she well would like to wait until she comes in for physical in the next month       No follow-ups on file.    Ramona Ann Aaseby-Aguilera, PA-C  [facility]    Answers for HPI/ROS submitted by the patient on 12/21/2019 "   Chronic problems general questions HPI Form  If you checked off any problems, how difficult have these problems made it for you to do your work, take care of things at home, or get along with other people?: Very difficult  PHQ9 TOTAL SCORE: 21  BRADLEY 7 TOTAL SCORE: 20

## 2019-12-21 NOTE — PATIENT INSTRUCTIONS
(F41.1) BRADLEY (generalized anxiety disorder)  Comment:   Plan: desvenlafaxine succinate (PRISTIQ) 25 MG 24 hr         tablet          (F32.9) Reactive depression  Comment:   Plan: desvenlafaxine succinate (PRISTIQ) 25 MG 24 hr         tablet      Was started on proistiq 2 months ago.  States she likes it due to not having significant side effects.  Has been taking 50 mg daily.  Still has significant  Anxiety and depression.  Well increase to 75 mg daily and advised to follow-up in 1 month     Recommend getting thyroid tested but she well would like to wait until she comes in for physical in the next month

## 2019-12-22 ASSESSMENT — ANXIETY QUESTIONNAIRES: GAD7 TOTAL SCORE: 20

## 2019-12-30 DIAGNOSIS — F41.1 GAD (GENERALIZED ANXIETY DISORDER): ICD-10-CM

## 2020-01-08 ENCOUNTER — TELEPHONE (OUTPATIENT)
Dept: FAMILY MEDICINE | Facility: CLINIC | Age: 25
End: 2020-01-08

## 2020-01-08 NOTE — TELEPHONE ENCOUNTER
Reason for call:  Medication   If this is a refill request, has the caller requested the refill from the pharmacy already? N/A  Will the patient be using a [facility]? N/A  Name of the pharmacy and phone number for the current request: on file    Name of the medication requested: LORazepam (ATIVAN) 0.5 MG tablet    desvenlafaxine succinate (PRISTIQ) 25 MG 24 hr tablet    Other request: Would like a refill on both medications. Patient indicated that they would not like to come back to see Dr. Aaseby-Aguilera  Phone number to reach patient:  Cell number on file:    Telephone Information:   Mobile [phone]       Best Time:  n/a    Can we leave a detailed message on this number?  Not Applicable

## 2020-01-09 NOTE — TELEPHONE ENCOUNTER
Pt advised should not need RF at this time.       She states she does not but is worried as the lorazepam did not have a RF on it.     Advised RF are not usually given with controlled substances.   She is due for f/u this month and if she does not want to see Lindsey then needs to schedule with another provider.   SABINA Mcnair is a provider she can establish care with    .Pt expressed understanding and acceptance of the plan.  Pt had no further questions at this time.  Advised can call back to clinic at any time with concerns.       Sienna Taylor RN

## 2020-02-24 DIAGNOSIS — F41.1 GAD (GENERALIZED ANXIETY DISORDER): ICD-10-CM

## 2020-02-26 NOTE — TELEPHONE ENCOUNTER
LMTRC    Pt needs an appt.  If she does not want to see Lindsey again then she needs to see another provider.  This was relayed to her on 1/9/2020.    Beatriz Calderon RN, BSN

## 2020-02-28 RX ORDER — LORAZEPAM 0.5 MG/1
0.5 TABLET ORAL 2 TIMES DAILY PRN
Qty: 10 TABLET | Refills: 0 | OUTPATIENT
Start: 2020-02-28

## 2020-12-14 ENCOUNTER — OFFICE VISIT (OUTPATIENT)
Dept: FAMILY MEDICINE | Facility: CLINIC | Age: 25
End: 2020-12-14
Payer: COMMERCIAL

## 2020-12-14 VITALS
HEART RATE: 105 BPM | HEIGHT: 65 IN | DIASTOLIC BLOOD PRESSURE: 79 MMHG | WEIGHT: 120 LBS | BODY MASS INDEX: 19.99 KG/M2 | OXYGEN SATURATION: 100 % | TEMPERATURE: 99.6 F | SYSTOLIC BLOOD PRESSURE: 118 MMHG

## 2020-12-14 DIAGNOSIS — N76.0 BV (BACTERIAL VAGINOSIS): ICD-10-CM

## 2020-12-14 DIAGNOSIS — R35.0 URINARY FREQUENCY: Primary | ICD-10-CM

## 2020-12-14 DIAGNOSIS — B96.89 BV (BACTERIAL VAGINOSIS): ICD-10-CM

## 2020-12-14 DIAGNOSIS — R31.29 MICROSCOPIC HEMATURIA: ICD-10-CM

## 2020-12-14 LAB
ALBUMIN UR-MCNC: NEGATIVE MG/DL
APPEARANCE UR: CLEAR
BACTERIA #/AREA URNS HPF: ABNORMAL /HPF
BILIRUB UR QL STRIP: NEGATIVE
COLOR UR AUTO: YELLOW
GLUCOSE UR STRIP-MCNC: NEGATIVE MG/DL
HCG UR QL: NEGATIVE
HGB UR QL STRIP: ABNORMAL
KETONES UR STRIP-MCNC: NEGATIVE MG/DL
LEUKOCYTE ESTERASE UR QL STRIP: NEGATIVE
Lab: ABNORMAL
NITRATE UR QL: NEGATIVE
NON-SQ EPI CELLS #/AREA URNS LPF: ABNORMAL /LPF
PH UR STRIP: 5.5 PH (ref 5–7)
RBC #/AREA URNS AUTO: ABNORMAL /HPF
SOURCE: ABNORMAL
SP GR UR STRIP: >1.03 (ref 1–1.03)
SPECIMEN SOURCE: ABNORMAL
UROBILINOGEN UR STRIP-ACNC: 0.2 EU/DL (ref 0.2–1)
WBC #/AREA URNS AUTO: ABNORMAL /HPF
WET PREP SPEC: ABNORMAL

## 2020-12-14 PROCEDURE — 81025 URINE PREGNANCY TEST: CPT | Performed by: NURSE PRACTITIONER

## 2020-12-14 PROCEDURE — 99214 OFFICE O/P EST MOD 30 MIN: CPT | Performed by: NURSE PRACTITIONER

## 2020-12-14 PROCEDURE — 87086 URINE CULTURE/COLONY COUNT: CPT | Performed by: NURSE PRACTITIONER

## 2020-12-14 PROCEDURE — 81001 URINALYSIS AUTO W/SCOPE: CPT | Performed by: NURSE PRACTITIONER

## 2020-12-14 PROCEDURE — 87591 N.GONORRHOEAE DNA AMP PROB: CPT | Performed by: NURSE PRACTITIONER

## 2020-12-14 PROCEDURE — 87210 SMEAR WET MOUNT SALINE/INK: CPT | Performed by: NURSE PRACTITIONER

## 2020-12-14 PROCEDURE — 87491 CHLMYD TRACH DNA AMP PROBE: CPT | Performed by: NURSE PRACTITIONER

## 2020-12-14 RX ORDER — METRONIDAZOLE 500 MG/1
500 TABLET ORAL 2 TIMES DAILY
Qty: 14 TABLET | Refills: 0 | Status: SHIPPED | OUTPATIENT
Start: 2020-12-14 | End: 2020-12-21

## 2020-12-14 ASSESSMENT — MIFFLIN-ST. JEOR: SCORE: 1292.26

## 2020-12-14 NOTE — LETTER
December 17, 2020      Yue Carbone  [address] [place] [address]        Dear ,      These are all of the results, Yue.  The only significant positive result is the one indicating that you have bacterial vaginosis and you should hopefully be doing better on the flagyl.   This does not however explain the finding of blood in your urine.   I would like you to follow up with your regular doctor in order to get a referral to a urologist to have a cystoscopy to determine whether or not you have any lesions in the urinary tract that would be causing you to have blood in your urine and urinary frequency.   Please call me if you have any questions or need additional help.   I have also forwarded this letter to Dr Catherine.    Resulted Orders   UA with Microscopic reflex to Culture   Result Value Ref Range    Color Urine Yellow     Appearance Urine Clear     Glucose Urine Negative NEG^Negative mg/dL    Bilirubin Urine Negative NEG^Negative    Ketones Urine Negative NEG^Negative mg/dL    Specific Gravity Urine >1.030 1.003 - 1.035    pH Urine 5.5 5.0 - 7.0 pH    Protein Albumin Urine Negative NEG^Negative mg/dL    Urobilinogen Urine 0.2 0.2 - 1.0 EU/dL    Nitrite Urine Negative NEG^Negative    Blood Urine Moderate (A) NEG^Negative    Leukocyte Esterase Urine Negative NEG^Negative    Source Midstream Urine     WBC Urine 0 - 5 OTO5^0 - 5 /HPF    RBC Urine O - 2 OTO2^O - 2 /HPF    Squamous Epithelial /LPF Urine Few FEW^Few /LPF    Bacteria Urine Many (A) NEG^Negative /HPF   HCG Qual, Urine ([id number])   Result Value Ref Range    HCG Qual Urine Negative NEG^Negative      Comment:      This test is for screening purposes.  Results should be interpreted along with   the clinical picture.  Confirmation testing is available if warranted by   ordering [id number], HCG Quantitative Pregnancy.     NEISSERIA GONORRHOEA PCR   Result Value Ref Range    Specimen Descrip Urine     N Gonorrhea PCR Negative NEG^Negative       Comment:      Negative for N. gonorrhoeae rRNA by transcription mediated amplification.  A negative result by transcription mediated amplification does not preclude   the presence of N. gonorrhoeae infection because results are dependent on   proper and adequate collection, absence of inhibitors, and sufficient rRNA to   be detected.     CHLAMYDIA TRACHOMATIS PCR   Result Value Ref Range    Specimen Description Urine     Chlamydia Trachomatis PCR Negative NEG^Negative      Comment:      Negative for C. trachomatis rRNA by transcription mediated amplification.  A negative result by transcription mediated amplification does not preclude   the presence of C. trachomatis infection because results are dependent on   proper and adequate collection, absence of inhibitors, and sufficient rRNA to   be detected.     Wet prep   Result Value Ref Range    Specimen Description Vagina     Special Requests Vagina     Wet Prep No Trichomonas seen     Wet Prep Clue cells seen  Few   (A)     Wet Prep WBC'S seen  Few       Wet Prep No yeast seen    Urine Culture Aerobic Bacterial   Result Value Ref Range    Specimen Description Midstream Urine     Culture Micro       10,000 to 50,000 colonies/mL  mixed urogenital cheikh  Susceptibility testing not routinely done       Sincerely,      MALLORIE Jay CNP

## 2020-12-14 NOTE — PROGRESS NOTES
"Subjective     Yue Carbone is a 25 year old female who presents to clinic today for the following health issues:    HPI         Chief Complaint   Patient presents with     Urinary Problem     frequency, pelvic cramping, nausea, regular periods. Had UA labs done thru her employer, brought results with her.  No way of knowing exactly when her LMP was but roughly when she had ua with microscopic hematuria  Last sexual encounter 11/6/20  Has hx of urinary incontinence   Voiding a couple of times an hour for past 2 weeks  No pain on voiding, no discharge ,  Variable amounts  No family hx diabetes       Review of Systems   Constitutional, HEENT, cardiovascular, pulmonary, gi and gu systems are negative, except as otherwise noted.      Objective    /79 (BP Location: Right arm, Cuff Size: Adult Regular)   Pulse 105   Temp 99.6  F (37.6  C) (Temporal)   Ht 1.638 m (5' 4.5\")   Wt 54.4 kg (120 lb)   SpO2 100%   Breastfeeding No   BMI 20.28 kg/m    Body mass index is 20.28 kg/m .  Physical Exam   GENERAL: healthy, alert and no distress      Results for orders placed or performed in visit on 12/14/20   UA with Microscopic reflex to Culture     Status: Abnormal    Specimen: Midstream Urine   Result Value Ref Range    Color Urine Yellow     Appearance Urine Clear     Glucose Urine Negative NEG^Negative mg/dL    Bilirubin Urine Negative NEG^Negative    Ketones Urine Negative NEG^Negative mg/dL    Specific Gravity Urine >1.030 1.003 - 1.035    pH Urine 5.5 5.0 - 7.0 pH    Protein Albumin Urine Negative NEG^Negative mg/dL    Urobilinogen Urine 0.2 0.2 - 1.0 EU/dL    Nitrite Urine Negative NEG^Negative    Blood Urine Moderate (A) NEG^Negative    Leukocyte Esterase Urine Negative NEG^Negative    Source Midstream Urine     WBC Urine 0 - 5 OTO5^0 - 5 /HPF    RBC Urine O - 2 OTO2^O - 2 /HPF    Squamous Epithelial /LPF Urine Few FEW^Few /LPF    Bacteria Urine Many (A) NEG^Negative /HPF   HCG Qual, Urine ([id number])     " Status: None   Result Value Ref Range    HCG Qual Urine Negative NEG^Negative   NEISSERIA GONORRHOEA PCR     Status: None    Specimen: Urine   Result Value Ref Range    Specimen Descrip Urine     N Gonorrhea PCR Negative NEG^Negative   CHLAMYDIA TRACHOMATIS PCR     Status: None    Specimen: Urine   Result Value Ref Range    Specimen Description Urine     Chlamydia Trachomatis PCR Negative NEG^Negative   Wet prep     Status: Abnormal    Specimen: Vagina   Result Value Ref Range    Specimen Description Vagina     Special Requests Vagina     Wet Prep No Trichomonas seen     Wet Prep Clue cells seen  Few   (A)     Wet Prep WBC'S seen  Few       Wet Prep No yeast seen    Urine Culture Aerobic Bacterial     Status: None    Specimen: Midstream Urine   Result Value Ref Range    Specimen Description Midstream Urine     Culture Micro       10,000 to 50,000 colonies/mL  mixed urogenital cheikh  Susceptibility testing not routinely done             Assessment & Plan     (R35.0) Urinary frequency  (primary encounter diagnosis)  Plan: UA with Microscopic reflex to Culture, HCG         Qual, Urine ([id number]), NEISSERIA GONORRHOEA         PCR, CHLAMYDIA TRACHOMATIS PCR, Wet prep       (N76.0,  B96.89) BV (bacterial vaginosis)  Plan: metroNIDAZOLE (FLAGYL) 500 MG tablet          (R31.29) Microscopic hematuria  Plan: Urine Culture Aerobic Bacterial      I have reported resutls to Yue with a recommendation to follow up with her PCP after tx for BV to further evaluate the hematuria  She may need urology referral with cystoscopy if hematuria persists.  I did also forward the message to MALLORIE Wasserman CNP  [facility]

## 2020-12-16 LAB
BACTERIA SPEC CULT: NORMAL
C TRACH DNA SPEC QL NAA+PROBE: NEGATIVE
N GONORRHOEA DNA SPEC QL NAA+PROBE: NEGATIVE
SPECIMEN SOURCE: NORMAL

## 2020-12-17 NOTE — RESULT ENCOUNTER NOTE
These are all of the results, Yue.  The only significant positive result is the one indicating that you have bacterial vaginosis and you should hopefully be doing better on the flagyl.  This does not however explain the finding of blood in your urine.  I would like you to follow up with your regular doctor in order to get a referral to a urologist to have a cystoscopy to determine whether or not you have any lesions in the urinary tract that would be causing you to have blood in your urine and urinary frequency.  Please call me if you have any questions or need additional help   I have also forwarded this letter to Dr Catherine

## 2021-01-29 ENCOUNTER — AMBULATORY - HEALTHEAST (OUTPATIENT)
Dept: NURSING | Facility: CLINIC | Age: 26
End: 2021-01-29

## 2021-02-22 ENCOUNTER — AMBULATORY - HEALTHEAST (OUTPATIENT)
Dept: NURSING | Facility: CLINIC | Age: 26
End: 2021-02-22

## 2021-08-11 ENCOUNTER — OFFICE VISIT (OUTPATIENT)
Dept: PEDIATRICS | Facility: CLINIC | Age: 26
End: 2021-08-11
Payer: COMMERCIAL

## 2021-08-11 VITALS
HEIGHT: 65 IN | RESPIRATION RATE: 16 BRPM | TEMPERATURE: 98.9 F | SYSTOLIC BLOOD PRESSURE: 104 MMHG | WEIGHT: 116.7 LBS | HEART RATE: 104 BPM | BODY MASS INDEX: 19.44 KG/M2 | DIASTOLIC BLOOD PRESSURE: 62 MMHG | OXYGEN SATURATION: 97 %

## 2021-08-11 DIAGNOSIS — F32.A DEPRESSION, UNSPECIFIED DEPRESSION TYPE: ICD-10-CM

## 2021-08-11 DIAGNOSIS — R11.2 NAUSEA AND VOMITING, INTRACTABILITY OF VOMITING NOT SPECIFIED, UNSPECIFIED VOMITING TYPE: ICD-10-CM

## 2021-08-11 DIAGNOSIS — R53.83 FATIGUE, UNSPECIFIED TYPE: Primary | ICD-10-CM

## 2021-08-11 DIAGNOSIS — Z30.9 ENCOUNTER FOR CONTRACEPTIVE MANAGEMENT, UNSPECIFIED TYPE: ICD-10-CM

## 2021-08-11 DIAGNOSIS — G89.29 CHRONIC ABDOMINAL PAIN: ICD-10-CM

## 2021-08-11 DIAGNOSIS — R30.0 DYSURIA: ICD-10-CM

## 2021-08-11 DIAGNOSIS — F41.9 ANXIETY: ICD-10-CM

## 2021-08-11 DIAGNOSIS — R10.9 CHRONIC ABDOMINAL PAIN: ICD-10-CM

## 2021-08-11 LAB
ALBUMIN UR-MCNC: NEGATIVE MG/DL
APPEARANCE UR: CLEAR
BACTERIA #/AREA URNS HPF: ABNORMAL /HPF
BASOPHILS # BLD AUTO: 0 10E3/UL (ref 0–0.2)
BASOPHILS NFR BLD AUTO: 0 %
BILIRUB UR QL STRIP: NEGATIVE
COLOR UR AUTO: YELLOW
EOSINOPHIL # BLD AUTO: 0.1 10E3/UL (ref 0–0.7)
EOSINOPHIL NFR BLD AUTO: 1 %
ERYTHROCYTE [DISTWIDTH] IN BLOOD BY AUTOMATED COUNT: 11.3 % (ref 10–15)
ERYTHROCYTE [SEDIMENTATION RATE] IN BLOOD BY WESTERGREN METHOD: 9 MM/HR (ref 0–20)
GLUCOSE UR STRIP-MCNC: NEGATIVE MG/DL
HCG UR QL: NEGATIVE
HCT VFR BLD AUTO: 41.6 % (ref 35–47)
HGB BLD-MCNC: 14 G/DL (ref 11.7–15.7)
HGB UR QL STRIP: ABNORMAL
KETONES UR STRIP-MCNC: 40 MG/DL
LEUKOCYTE ESTERASE UR QL STRIP: NEGATIVE
LYMPHOCYTES # BLD AUTO: 1.7 10E3/UL (ref 0.8–5.3)
LYMPHOCYTES NFR BLD AUTO: 15 %
MCH RBC QN AUTO: 31.6 PG (ref 26.5–33)
MCHC RBC AUTO-ENTMCNC: 33.7 G/DL (ref 31.5–36.5)
MCV RBC AUTO: 94 FL (ref 78–100)
MONOCYTES # BLD AUTO: 0.6 10E3/UL (ref 0–1.3)
MONOCYTES NFR BLD AUTO: 5 %
MUCOUS THREADS #/AREA URNS LPF: PRESENT /LPF
NEUTROPHILS # BLD AUTO: 9 10E3/UL (ref 1.6–8.3)
NEUTROPHILS NFR BLD AUTO: 79 %
NITRATE UR QL: NEGATIVE
PH UR STRIP: 6.5 [PH] (ref 5–7)
PLATELET # BLD AUTO: 339 10E3/UL (ref 150–450)
RBC # BLD AUTO: 4.43 10E6/UL (ref 3.8–5.2)
RBC #/AREA URNS AUTO: ABNORMAL /HPF
SP GR UR STRIP: 1.02 (ref 1–1.03)
SQUAMOUS #/AREA URNS AUTO: ABNORMAL /LPF
UROBILINOGEN UR STRIP-ACNC: 0.2 E.U./DL
WBC # BLD AUTO: 11.4 10E3/UL (ref 4–11)
WBC #/AREA URNS AUTO: ABNORMAL /HPF

## 2021-08-11 PROCEDURE — 86140 C-REACTIVE PROTEIN: CPT | Performed by: NURSE PRACTITIONER

## 2021-08-11 PROCEDURE — 82728 ASSAY OF FERRITIN: CPT | Performed by: NURSE PRACTITIONER

## 2021-08-11 PROCEDURE — 80050 GENERAL HEALTH PANEL: CPT | Performed by: NURSE PRACTITIONER

## 2021-08-11 PROCEDURE — 81025 URINE PREGNANCY TEST: CPT | Performed by: NURSE PRACTITIONER

## 2021-08-11 PROCEDURE — 85652 RBC SED RATE AUTOMATED: CPT | Performed by: NURSE PRACTITIONER

## 2021-08-11 PROCEDURE — 81001 URINALYSIS AUTO W/SCOPE: CPT | Performed by: NURSE PRACTITIONER

## 2021-08-11 PROCEDURE — 99214 OFFICE O/P EST MOD 30 MIN: CPT | Performed by: NURSE PRACTITIONER

## 2021-08-11 PROCEDURE — 36415 COLL VENOUS BLD VENIPUNCTURE: CPT | Performed by: NURSE PRACTITIONER

## 2021-08-11 ASSESSMENT — MIFFLIN-ST. JEOR: SCORE: 1276.26

## 2021-08-11 NOTE — PROGRESS NOTES
Assessment & Plan     Fatigue, unspecified type  Proceed with labs. Wonder if mood could be contributing as well.  - CBC with platelets and differential; Future  - TSH with free T4 reflex; Future  - Comprehensive metabolic panel (BMP + Alb, Alk Phos, ALT, AST, Total. Bili, TP); Future  - Ferritin; Future  - ESR: Erythrocyte sedimentation rate; Future  - CRP, inflammation; Future  - CBC with platelets and differential  - TSH with free T4 reflex  - Comprehensive metabolic panel (BMP + Alb, Alk Phos, ALT, AST, Total. Bili, TP)  - Ferritin  - ESR: Erythrocyte sedimentation rate  - CRP, inflammation    Nausea and vomiting, intractability of vomiting not specified, unspecified vomiting type  Chronic abdominal pain  Proceed with labs. No acute abdomen findings. Hold off on imaging for now. Anxiety could be contributing as well-her symptoms are most noticeable during the weekdays and not on weekends (does not work weekends). Will trial Pepcid for possible reflux. Also to schedule with psych to discuss med management and mood as she has seen in the past with hx of med s/e.  - CBC with platelets and differential; Future  - TSH with free T4 reflex; Future  - Comprehensive metabolic panel (BMP + Alb, Alk Phos, ALT, AST, Total. Bili, TP); Future  - Ferritin; Future  - ESR: Erythrocyte sedimentation rate; Future  - CRP, inflammation; Future  - CBC with platelets and differential  - TSH with free T4 reflex  - Comprehensive metabolic panel (BMP + Alb, Alk Phos, ALT, AST, Total. Bili, TP)  - Ferritin  - ESR: Erythrocyte sedimentation rate  - CRP, inflammation    Anxiety  See above  - MENTAL HEALTH REFERRAL  - Adult; Outpatient Treatment; Individual/Couples/Family/Group Therapy/Health Psychology; Other: [facility] [phone]; We will contact you to schedule the appointment or please call with any questions; Future  - MENTAL HEALTH REFERRAL  - Adult; Psychiatry; Psychiatry; Other: [facility] [phone]; We  will contact you to schedule the appointment or please call with any questions; Future    Depression, unspecified depression type  See above  - MENTAL HEALTH REFERRAL  - Adult; Outpatient Treatment; Individual/Couples/Family/Group Therapy/Health Psychology; Other: [facility] [phone]; We will contact you to schedule the appointment or please call with any questions; Future  - MENTAL HEALTH REFERRAL  - Adult; Psychiatry; Psychiatry; Other: [facility] [phone]; We will contact you to schedule the appointment or please call with any questions; Future    Encounter for contraceptive management, unspecified type  Would like to discuss IUD vs implant. Concerned about s/e. Struggles with remembering OCPs.  - Ob/Gyn Referral; Future    Dysuria  Normal UA. Monitor for now.  - UA Macro with Reflex to Micro and Culture - lab collect; Future  - UA Macro with Reflex to Micro and Culture - lab collect  - Urine Microscopic             Patient Instructions   At this point- I don't have a pinpoint cuase for your stomach symptoms. I wonder if this could be reflux/heartburn vs anxiety.    We will start with :  Labs today  I want you to try taking an over-the-counter medicine called Pepcid twice daily for possible reflux.  Schedule with therapist and psychiatrist to discuss possible meds.  Schedule a physical with Rosa Moore in about 1 month (around 9/11/2021) for Physical Exam.    Chichi Villafana NP  [facility] Turner is a 26 year old who presents for the following health issues     HPI     Acute Illness  Acute illness concerns: nausea and vomiting, diarrhea  Onset/Duration: 6 months  Symptoms:  Fever: unsure  Chills/Sweats: YES  Headache (location?): YES, in the mornings. Slight headachea c ouple times per week. Feels from being tired.  Sinus Pressure: no  Conjunctivitis:  no  Ear Pain: no  Rhinorrhea: YES (allergies)  Congestion: YES (allergies)  Sore  "Throat: no  Cough: no  Wheeze: no  Decreased Appetite: YES  Nausea: YES  Vomiting: YES  Diarrhea: YES  Dysuria/Freq.: YES, frequency  Dysuria or Hematuria: no  Fatigue/Achiness: YES  Sick/Strep Exposure: no  Therapies tried and outcome: pepto    Pt is new to the clinic.    1. BIRTH CONTROL  Does not take OCP regularly-bad acne, hair loss, s/e, bloating.  Condoms if needed.  Would liek to discuss IUD vs implant.  Took home pregnancy test 1 month ago, was negative.  LMP 1 month ago.    2. N/V  In the mornings she will have N/V/D, pain/cramping feels like period cramps.  Vomits almost every weekday  Symptoms not noted on weekends, she sleeps in  Admits to late night eating  No hematemesis.  Diarrhea throughout the day, intermittent.  Rare constipation.   Not worst with eating.  Eating well during the day otherwise.   Also has reflux.   Most days during the week she will vomit x1  Takes gummy probiotic    3. Mood  Admits to anxiety, shaking. Unsure if related to above.  Anxiety about \"almost everything\"-social plans, work. Cancels plans a lot.  Saw psych in the past, diagnosed with bipolar.  Stopped taking meds. Has tried multiple SSRIs with s/e.    Review of Systems   Constitutional, HEENT, cardiovascular, pulmonary, GI, , musculoskeletal, neuro, skin, endocrine and psych systems are negative, except as otherwise noted.      Objective    /62 (BP Location: Right arm, Patient Position: Chair, Cuff Size: Adult Regular)   Pulse 104   Temp 98.9  F (37.2  C) (Tympanic)   Resp 16   Ht 1.645 m (5' 4.75\")   Wt 52.9 kg (116 lb 11.2 oz)   SpO2 97%   BMI 19.57 kg/m    Body mass index is 19.57 kg/m .  Physical Exam   GENERAL: healthy, alert and no distress  RESP: lungs clear to auscultation - no rales, rhonchi or wheezes  CV: regular rate and rhythm, normal S1 S2, no S3 or S4, no murmur, click or rub, no peripheral edema and peripheral pulses strong  ABDOMEN: tenderness epigastric  PSYCH: mentation appears normal, " affect normal/bright    Results for orders placed or performed in visit on 08/11/21 (from the past 24 hour(s))   UA Macro with Reflex to Micro and Culture - lab collect    Specimen: Urine, Clean Catch   Result Value Ref Range    Color Urine Yellow Colorless, Straw, Light Yellow, Yellow    Appearance Urine Clear Clear    Glucose Urine Negative Negative mg/dL    Bilirubin Urine Negative Negative    Ketones Urine 40  (A) Negative mg/dL    Specific Gravity Urine 1.020 1.003 - 1.035    Blood Urine Small (A) Negative    pH Urine 6.5 5.0 - 7.0    Protein Albumin Urine Negative Negative mg/dL    Urobilinogen Urine 0.2 0.2, 1.0 E.U./dL    Nitrite Urine Negative Negative    Leukocyte Esterase Urine Negative Negative   Urine Microscopic   Result Value Ref Range    Bacteria Urine Few (A) None Seen /HPF    RBC Urine 0-2 0-2 /HPF /HPF    WBC Urine 0-5 0-5 /HPF /HPF    Squamous Epithelials Urine Few (A) None Seen /LPF    Mucus Urine Present (A) None Seen /LPF    Narrative    Urine Culture not indicated   HCG Qual, Urine ([id number])   Result Value Ref Range    hCG Urine Qualitative Negative Negative   ESR: Erythrocyte sedimentation rate   Result Value Ref Range    Erythrocyte Sedimentation Rate 9 0 - 20 mm/hr   CBC with platelets and differential    Narrative    The following orders were created for panel order CBC with platelets and differential.  Procedure                               Abnormality         Status                     ---------                               -----------         ------                     CBC with platelets and d...[[id number]]  Abnormal            Final result                 Please view results for these tests on the individual orders.   CBC with platelets and differential   Result Value Ref Range    WBC Count 11.4 (H) 4.0 - 11.0 10e3/uL    RBC Count 4.43 3.80 - 5.20 10e6/uL    Hemoglobin 14.0 11.7 - 15.7 g/dL    Hematocrit 41.6 35.0 - 47.0 %    MCV 94 78 - 100 fL    MCH 31.6 26.5 - 33.0 pg    MCHC  33.7 31.5 - 36.5 g/dL    RDW 11.3 10.0 - 15.0 %    Platelet Count 339 150 - 450 10e3/uL    % Neutrophils 79 %    % Lymphocytes 15 %    % Monocytes 5 %    % Eosinophils 1 %    % Basophils 0 %    Absolute Neutrophils 9.0 (H) 1.6 - 8.3 10e3/uL    Absolute Lymphocytes 1.7 0.8 - 5.3 10e3/uL    Absolute Monocytes 0.6 0.0 - 1.3 10e3/uL    Absolute Eosinophils 0.1 0.0 - 0.7 10e3/uL    Absolute Basophils 0.0 0.0 - 0.2 10e3/uL

## 2021-08-11 NOTE — PATIENT INSTRUCTIONS
At this point- I don't have a pinpoint cuase for your stomach symptoms. I wonder if this could be reflux/heartburn vs anxiety.    We will start with :  Labs today  I want you to try taking an over-the-counter medicine called Pepcid twice daily for possible reflux.  Schedule with therapist and psychiatrist to discuss possible meds.  Schedule a physical with Rosa Angel

## 2021-08-12 LAB
ALBUMIN SERPL-MCNC: 4.6 G/DL (ref 3.4–5)
ALP SERPL-CCNC: 39 U/L (ref 40–150)
ALT SERPL W P-5'-P-CCNC: 15 U/L (ref 0–50)
ANION GAP SERPL CALCULATED.3IONS-SCNC: 6 MMOL/L (ref 3–14)
AST SERPL W P-5'-P-CCNC: 14 U/L (ref 0–45)
BILIRUB SERPL-MCNC: 0.6 MG/DL (ref 0.2–1.3)
BUN SERPL-MCNC: 11 MG/DL (ref 7–30)
CALCIUM SERPL-MCNC: 9.4 MG/DL (ref 8.5–10.1)
CHLORIDE BLD-SCNC: 107 MMOL/L (ref 94–109)
CO2 SERPL-SCNC: 25 MMOL/L (ref 20–32)
CREAT SERPL-MCNC: 0.63 MG/DL (ref 0.52–1.04)
CRP SERPL-MCNC: <2.9 MG/L (ref 0–8)
FERRITIN SERPL-MCNC: 34 NG/ML (ref 12–150)
GFR SERPL CREATININE-BSD FRML MDRD: >90 ML/MIN/1.73M2
GLUCOSE BLD-MCNC: 67 MG/DL (ref 70–99)
POTASSIUM BLD-SCNC: 3.8 MMOL/L (ref 3.4–5.3)
PROT SERPL-MCNC: 7.9 G/DL (ref 6.8–8.8)
SODIUM SERPL-SCNC: 138 MMOL/L (ref 133–144)
TSH SERPL DL<=0.005 MIU/L-ACNC: 0.65 MU/L (ref 0.4–4)

## 2021-09-04 ENCOUNTER — HEALTH MAINTENANCE LETTER (OUTPATIENT)
Age: 26
End: 2021-09-04

## 2022-08-25 ENCOUNTER — TELEPHONE (OUTPATIENT)
Dept: FAMILY MEDICINE | Facility: CLINIC | Age: 27
End: 2022-08-25

## 2022-08-25 NOTE — TELEPHONE ENCOUNTER
Summary:    Patient is due/failing the following:   PAP    Reviewed:    [] CARE EVERYWHERE  [] LAST OV NOTE   [] FYI TAB  [] MYCHART ACTIVE?  [] LAST PANEL ENCOUNTER  [] FUTURE APPTS  [] IMMUNIZATIONS  [] Media Tab            Action needed:   Patient needs office visit for pap.    Type of outreach:    Sent FilesXhart message.                                                                               Cassandra Vaca/EMILY  [place]---[facility]

## 2022-10-16 ENCOUNTER — HEALTH MAINTENANCE LETTER (OUTPATIENT)
Age: 27
End: 2022-10-16

## 2022-11-30 ENCOUNTER — OFFICE VISIT (OUTPATIENT)
Dept: MIDWIFE SERVICES | Facility: CLINIC | Age: 27
End: 2022-11-30
Payer: COMMERCIAL

## 2022-11-30 VITALS
BODY MASS INDEX: 20.86 KG/M2 | SYSTOLIC BLOOD PRESSURE: 100 MMHG | WEIGHT: 125.2 LBS | DIASTOLIC BLOOD PRESSURE: 64 MMHG | HEIGHT: 65 IN

## 2022-11-30 DIAGNOSIS — N92.3 INTERMENSTRUAL BLEEDING: ICD-10-CM

## 2022-11-30 DIAGNOSIS — Z01.419 ENCOUNTER FOR GYNECOLOGICAL EXAMINATION WITHOUT ABNORMAL FINDING: Primary | ICD-10-CM

## 2022-11-30 DIAGNOSIS — Z11.3 SCREEN FOR STD (SEXUALLY TRANSMITTED DISEASE): ICD-10-CM

## 2022-11-30 DIAGNOSIS — Z30.09 BIRTH CONTROL COUNSELING: ICD-10-CM

## 2022-11-30 LAB
CLUE CELLS: NORMAL
HCG IFA URINE: NEGATIVE
TRICHOMONAS, WET PREP: NORMAL
WBC'S/HIGH POWER FIELD, WET PREP: NORMAL
YEAST, WET PREP: NORMAL

## 2022-11-30 PROCEDURE — 99203 OFFICE O/P NEW LOW 30 MIN: CPT | Performed by: ADVANCED PRACTICE MIDWIFE

## 2022-11-30 PROCEDURE — 87210 SMEAR WET MOUNT SALINE/INK: CPT | Performed by: ADVANCED PRACTICE MIDWIFE

## 2022-11-30 PROCEDURE — 87591 N.GONORRHOEAE DNA AMP PROB: CPT | Performed by: ADVANCED PRACTICE MIDWIFE

## 2022-11-30 PROCEDURE — 84703 CHORIONIC GONADOTROPIN ASSAY: CPT | Performed by: ADVANCED PRACTICE MIDWIFE

## 2022-11-30 PROCEDURE — 87491 CHLMYD TRACH DNA AMP PROBE: CPT | Performed by: ADVANCED PRACTICE MIDWIFE

## 2022-11-30 PROCEDURE — 88141 CYTOPATH C/V INTERPRET: CPT | Performed by: PATHOLOGY

## 2022-11-30 PROCEDURE — 88175 CYTOPATH C/V AUTO FLUID REDO: CPT | Performed by: ADVANCED PRACTICE MIDWIFE

## 2022-11-30 RX ORDER — DIVALPROEX SODIUM 500 MG/1
TABLET, EXTENDED RELEASE ORAL AT BEDTIME
COMMUNITY
Start: 2022-10-31 | End: 2023-11-10

## 2022-11-30 RX ORDER — CLINDAMYCIN PHOSPHATE 10 UG/ML
LOTION TOPICAL
COMMUNITY
Start: 2022-01-26 | End: 2023-11-10

## 2022-11-30 RX ORDER — LEVONORGESTREL/ETHIN.ESTRADIOL 0.1-0.02MG
1 TABLET ORAL DAILY
Qty: 90 TABLET | Refills: 3 | Status: SHIPPED | OUTPATIENT
Start: 2022-11-30 | End: 2023-06-16

## 2022-11-30 RX ORDER — ARIPIPRAZOLE 5 MG/1
5 TABLET ORAL EVERY MORNING
COMMUNITY
Start: 2022-10-31 | End: 2023-11-10

## 2022-11-30 RX ORDER — SPIRONOLACTONE 100 MG/1
100 TABLET, FILM COATED ORAL AT BEDTIME
COMMUNITY
Start: 2022-11-14

## 2022-11-30 NOTE — PROGRESS NOTES
Yue is a 27 year old  female who presents for GYN only exam, has physical scheduled with PCP in December.     Besides routine health maintenance,  she would like to discuss intermenstrual spotting and pelvic pain.  HPI:  Pelvic pain started 3 weeks ago, spotting occurred 3 days ago after intercourse.  This was after the end of her period, bright red.   REPRODUCTIVE/GYNECOLOGIC HISTORY:  Menses are typically normal.  She is sexually active with a stable partner for the last year.  They do not use condoms consistently and she is not on birth control.     Patient's last menstrual period was 2022 (exact date).    She is not currently considering pregnancy, discussed options to start birth control.  She is open to restarting oral contraceptives.   STI testing offered?  Accepted  History of abnormal Pap smear:  No, agrees to first pap today.   She occasionally vapes tobacco.      HEALTH MAINTENANCE:  Regular Self Breast Exams: reviewed.     TSH   Date Value Ref Range Status   2021 0.65 0.40 - 4.00 mU/L Final     Pap; (No results found for: PAP )  Immunizations up to date: declines.       HISTORY:  OB History    Para Term  AB Living   0 0 0 0 0 0   SAB IAB Ectopic Multiple Live Births   0 0 0 0 0     History reviewed. No pertinent past medical history.  History reviewed. No pertinent surgical history.  Family History   Problem Relation Age of Onset     Family History Negative Mother      Family History Negative Father      Breast Cancer Maternal Grandmother      Social History     Socioeconomic History     Marital status: Single     Spouse name: None     Number of children: None     Years of education: None     Highest education level: None   Tobacco Use     Smoking status: Never     Smokeless tobacco: Never   Vaping Use     Vaping Use: Some days   Substance and Sexual Activity     Alcohol use: Yes     Drug use: Never     Sexual activity: Yes     Partners: Male   Other Topics Concern      "Parent/sibling w/ CABG, MI or angioplasty before 65F 55M? No          Allergies   Allergen Reactions     Seasonal Allergies        Past medical, surgical, social and family history were reviewed and updated in EPIC.    ROS:   Negative x 10.     PHYSICAL EXAM:  /64 (BP Location: Left arm, Cuff Size: Adult Regular)   Ht 1.651 m (5' 5\")   Wt 56.8 kg (125 lb 3.2 oz)   LMP 11/21/2022 (Exact Date)   BMI 20.83 kg/m     BMI: Body mass index is 20.83 kg/m .  Constitutional: healthy, alert and no distress  Breast: normal without masses, tenderness or nipple discharge and no palpable axillary masses or adenopathy  PELVIC EXAM:  Vulva: No lesions, no adenopathy, BUS WNL  Vagina: Moist, pink, discharge normal,  well rugated, no lesions  Cervix: smooth, pink, no visible lesions  Uterus: Normal size, mobile  Ovaries: No masses palpated  Patient reported slight tenderness over uterus with bimanual exam.   Obtained:  GC/CT, wet prep and pap smear.   UPT done in clinic today - negative.     ASSESSMENT/PLAN:    ICD-10-CM    1. Encounter for gynecological examination without abnormal finding  Z01.419 HIV Antigen Antibody Combo     Hepatitis C Screen Reflex to HCV RNA Quant and Genotype     Treponema Abs w Reflex to RPR and Titer     US Pelvic Complete with Transvaginal     Pap diagnostic reflex to HPV if ASCUS      2. Screen for STD (sexually transmitted disease)  Z11.3 Chlamydia & Gonorrhea by PCR, GICH/Range - Clinic Collect      3. Intermenstrual bleeding  N92.3 Wet prep - Clinic Collect     HCG IFA Urine POCT, Enter/Edit Result     CANCELED: HCG qualitative urine      4. Birth control counseling  Z30.09 levonorgestrel-ethinyl estradiol (AVIANE) 0.1-20 MG-MCG tablet        Results for orders placed or performed in visit on 11/30/22   HCG IFA Urine POCT, Enter/Edit Result     Status: None   Result Value Ref Range    HCG IFA Urine Negative neg   Wet prep - Clinic Collect     Status: Normal    Specimen: Vagina; Swab   Result " Value Ref Range    Trichomonas Absent Absent    Yeast Absent Absent    Clue Cells Absent Absent    WBCs/high power field None None         COUNSELING:   Recommend pelvic US given pain and intermenstrual bleeding.  Reviewed birth control, rx provided for 1 year.  Keep physical exam for non-GYN related health concerns and questions.    40 min spent today in visit today and/or with counseling, coordination of care, reviewing history and medical records.

## 2022-11-30 NOTE — NURSING NOTE
"Chief Complaint   Patient presents with     Women's Health     No previous pap     Pelvic Pain     Right side pain with intercourse for about 3 weeks. Had red and watery bleeding after intercourse 2-3 days ago.       Initial /64 (BP Location: Left arm, Cuff Size: Adult Regular)   Ht 1.651 m (5' 5\")   Wt 56.8 kg (125 lb 3.2 oz)   LMP 2022 (Exact Date)   BMI 20.83 kg/m   Estimated body mass index is 20.83 kg/m  as calculated from the following:    Height as of this encounter: 1.651 m (5' 5\").    Weight as of this encounter: 56.8 kg (125 lb 3.2 oz).  BP completed using cuff size: regular    Questioned patient about current smoking habits.  Pt. has never smoked.          The following HM Due: pap smear  "

## 2022-12-01 LAB
C TRACH DNA SPEC QL PROBE+SIG AMP: NEGATIVE
N GONORRHOEA DNA SPEC QL NAA+PROBE: NEGATIVE

## 2022-12-02 LAB
BKR LAB AP GYN ADEQUACY: ABNORMAL
BKR LAB AP GYN INTERPRETATION: ABNORMAL
BKR LAB AP HPV REFLEX: ABNORMAL
BKR LAB AP LMP: ABNORMAL
BKR LAB AP PREVIOUS ABNORMAL: ABNORMAL
PATH REPORT.COMMENTS IMP SPEC: ABNORMAL
PATH REPORT.COMMENTS IMP SPEC: ABNORMAL
PATH REPORT.RELEVANT HX SPEC: ABNORMAL

## 2022-12-09 ENCOUNTER — ANCILLARY PROCEDURE (OUTPATIENT)
Dept: ULTRASOUND IMAGING | Facility: CLINIC | Age: 27
End: 2022-12-09
Attending: ADVANCED PRACTICE MIDWIFE
Payer: COMMERCIAL

## 2022-12-09 DIAGNOSIS — Z01.419 ENCOUNTER FOR GYNECOLOGICAL EXAMINATION WITHOUT ABNORMAL FINDING: ICD-10-CM

## 2022-12-09 DIAGNOSIS — N83.201 OVARIAN CYST, RIGHT: Primary | ICD-10-CM

## 2022-12-09 PROCEDURE — 76830 TRANSVAGINAL US NON-OB: CPT | Performed by: OBSTETRICS & GYNECOLOGY

## 2022-12-09 PROCEDURE — 76856 US EXAM PELVIC COMPLETE: CPT | Performed by: OBSTETRICS & GYNECOLOGY

## 2022-12-12 ENCOUNTER — PATIENT OUTREACH (OUTPATIENT)
Dept: MIDWIFE SERVICES | Facility: CLINIC | Age: 27
End: 2022-12-12

## 2023-01-11 ENCOUNTER — OFFICE VISIT (OUTPATIENT)
Dept: OBGYN | Facility: CLINIC | Age: 28
End: 2023-01-11
Payer: COMMERCIAL

## 2023-01-11 DIAGNOSIS — Z01.812 PRE-PROCEDURE LAB EXAM: ICD-10-CM

## 2023-01-11 DIAGNOSIS — R87.611 PAPANICOLAOU SMEAR OF CERVIX WITH ATYPICAL SQUAMOUS CELLS CANNOT EXCLUDE HIGH GRADE SQUAMOUS INTRAEPITHELIAL LESION (ASC-H): Primary | ICD-10-CM

## 2023-01-11 LAB — HCG UR QL: NEGATIVE

## 2023-01-11 PROCEDURE — 88305 TISSUE EXAM BY PATHOLOGIST: CPT | Performed by: PATHOLOGY

## 2023-01-11 PROCEDURE — 81025 URINE PREGNANCY TEST: CPT | Performed by: OBSTETRICS & GYNECOLOGY

## 2023-01-11 PROCEDURE — 88342 IMHCHEM/IMCYTCHM 1ST ANTB: CPT | Mod: 59 | Performed by: PATHOLOGY

## 2023-01-11 PROCEDURE — 57454 BX/CURETT OF CERVIX W/SCOPE: CPT | Performed by: OBSTETRICS & GYNECOLOGY

## 2023-01-11 PROCEDURE — 88360 TUMOR IMMUNOHISTOCHEM/MANUAL: CPT | Performed by: PATHOLOGY

## 2023-01-11 NOTE — PROGRESS NOTES
Colposcopy Note    Past History:     Patient's last menstrual period was 2022 (exact date).  Patient is not pregnant.     Previous Abnormal Pap Hx:  Abnormal Pap dated:  Pap-ASC-H  Prior Colposcopy dated: N/A  Prior Treatment dated: N/A    Indications:  Encounter Diagnosis   Name Primary?     Papanicolaou smear of cervix with atypical squamous cells cannot exclude high grade squamous intraepithelial lesion (ASC-H) Yes       PROCEDURE:  The procedure was explained, and informed consent obtained. The patient was placed in the dorsal lithotomy position. A vaginal speculum was placed. A GC/Chlamydia culture was not obtained. Dilute acetic acid was applied to cervix. The exocervix was visualized. The transformation zone was visualized satisfactorily. Colposcopy was done with white light and with the [place] light. Endocervical curettage was done. Biopsy done at the 7 o clock position(s) Colposcopy of vagina revealed: normal. The patient tolerated the procedure well. At the completion of the procedure, only scant bleeding was present. Hemostasis was achieved with Monsel's solution.    FINDINGS:  White epithelium @ 7 o clock position(s).  Punctation: absent  Mosaicism: absent    Physical Exam  Genitourinary:              ASSESSMENT:  Encounter Diagnosis   Name Primary?     Papanicolaou smear of cervix with atypical squamous cells cannot exclude high grade squamous intraepithelial lesion (ASC-H) Yes       PLAN:  If Bx shows MURIEL 1 or less, follow-up with Ob/Gyn per ASCCP Guideline in 1 year for Pap & HPV cotest at next annual exam.     Procedure Planned:   If HGSIL, consider Laser vaporization.    Tavo Neff MD

## 2023-01-11 NOTE — NURSING NOTE
"Chief Complaint   Patient presents with     Colposcopy     ASC-H       Initial LMP 2022 (Exact Date)  Estimated body mass index is 20.83 kg/m  as calculated from the following:    Height as of 22: 1.651 m (5' 5\").    Weight as of 22: 56.8 kg (125 lb 3.2 oz).  BP completed using cuff size: regular    Questioned patient about current smoking habits.  Pt. has never smoked.          The following HM Due: NONE    Zana Gray CMA                "

## 2023-01-16 PROBLEM — N87.1 MODERATE CERVICAL DYSPLASIA, HISTOLOGICALLY CONFIRMED: Status: ACTIVE | Noted: 2023-01-16

## 2023-01-16 LAB
PATH REPORT.COMMENTS IMP SPEC: NORMAL
PATH REPORT.FINAL DX SPEC: NORMAL
PATH REPORT.GROSS SPEC: NORMAL
PATH REPORT.MICROSCOPIC SPEC OTHER STN: NORMAL
PATH REPORT.MICROSCOPIC SPEC OTHER STN: NORMAL
PATH REPORT.RELEVANT HX SPEC: NORMAL
PHOTO IMAGE: NORMAL

## 2023-01-16 NOTE — RESULT ENCOUNTER NOTE
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  She should get this scheduled in the office the week following a menstrual cycle in the next 1-2 months.  Assist her in scheduling.    Tavo Neff MD

## 2023-01-17 ENCOUNTER — TELEPHONE (OUTPATIENT)
Dept: OBGYN | Facility: CLINIC | Age: 28
End: 2023-01-17

## 2023-01-17 DIAGNOSIS — F41.9 ANXIETY: Primary | ICD-10-CM

## 2023-01-17 RX ORDER — LORAZEPAM 1 MG/1
1 TABLET ORAL ONCE
Qty: 1 TABLET | Refills: 0 | Status: SHIPPED | OUTPATIENT
Start: 2023-01-17 | End: 2023-01-17

## 2023-01-17 NOTE — TELEPHONE ENCOUNTER
Rx Ativan 1 mg (#1) sent to pharm.  She is to take 30-45 min before appt.  As mentioned, she will need a .

## 2023-01-17 NOTE — TELEPHONE ENCOUNTER
Pt is requesting an rx that she can take prior to her LEEP to help with her anxiety.    Pharmacy pended.    She is aware that if we do rx something, that she will need a .     Martha WONG RN

## 2023-01-27 ENCOUNTER — PATIENT OUTREACH (OUTPATIENT)
Dept: OBGYN | Facility: CLINIC | Age: 28
End: 2023-01-27
Payer: COMMERCIAL

## 2023-01-27 PROBLEM — R87.611 PAPANICOLAOU SMEAR OF CERVIX WITH ATYPICAL SQUAMOUS CELLS CANNOT EXCLUDE HIGH GRADE SQUAMOUS INTRAEPITHELIAL LESION (ASC-H): Status: ACTIVE | Noted: 2022-11-30

## 2023-02-10 ENCOUNTER — OFFICE VISIT (OUTPATIENT)
Dept: OBGYN | Facility: CLINIC | Age: 28
End: 2023-02-10
Payer: COMMERCIAL

## 2023-02-10 VITALS — SYSTOLIC BLOOD PRESSURE: 102 MMHG | BODY MASS INDEX: 20.8 KG/M2 | WEIGHT: 125 LBS | DIASTOLIC BLOOD PRESSURE: 74 MMHG

## 2023-02-10 DIAGNOSIS — N87.1 MODERATE DYSPLASIA OF CERVIX (CIN II): Primary | ICD-10-CM

## 2023-02-10 PROCEDURE — 57461 CONZ OF CERVIX W/SCOPE LEEP: CPT | Performed by: OBSTETRICS & GYNECOLOGY

## 2023-02-10 PROCEDURE — 88307 TISSUE EXAM BY PATHOLOGIST: CPT | Performed by: PATHOLOGY

## 2023-02-10 PROCEDURE — 88305 TISSUE EXAM BY PATHOLOGIST: CPT | Performed by: PATHOLOGY

## 2023-02-10 NOTE — NURSING NOTE
"Chief Complaint   Patient presents with     Leep   Drawn for local block :  10cc 1% Xylocaine with epi  Lot #[id number]  Exp 07/2024    initial /74   Wt 56.7 kg (125 lb)   LMP 12/19/2022 (Exact Date)   BMI 20.80 kg/m   Estimated body mass index is 20.8 kg/m  as calculated from the following:    Height as of 11/30/22: 1.651 m (5' 5\").    Weight as of this encounter: 56.7 kg (125 lb).  BP completed using cuff size regular.  Shanti Muller CMA    "

## 2023-02-14 LAB
PATH REPORT.COMMENTS IMP SPEC: NORMAL
PATH REPORT.COMMENTS IMP SPEC: NORMAL
PATH REPORT.FINAL DX SPEC: NORMAL
PATH REPORT.GROSS SPEC: NORMAL
PATH REPORT.MICROSCOPIC SPEC OTHER STN: NORMAL
PATH REPORT.RELEVANT HX SPEC: NORMAL
PHOTO IMAGE: NORMAL

## 2023-03-10 ENCOUNTER — PATIENT OUTREACH (OUTPATIENT)
Dept: OBGYN | Facility: CLINIC | Age: 28
End: 2023-03-10
Payer: COMMERCIAL

## 2023-03-10 PROBLEM — N87.1 MODERATE CERVICAL DYSPLASIA, HISTOLOGICALLY CONFIRMED: Status: ACTIVE | Noted: 2022-11-30

## 2023-06-15 ENCOUNTER — MYC MEDICAL ADVICE (OUTPATIENT)
Dept: MIDWIFE SERVICES | Facility: CLINIC | Age: 28
End: 2023-06-15
Payer: COMMERCIAL

## 2023-06-15 DIAGNOSIS — Z30.09 BIRTH CONTROL COUNSELING: ICD-10-CM

## 2023-06-16 RX ORDER — LEVONORGESTREL/ETHIN.ESTRADIOL 0.1-0.02MG
1 TABLET ORAL DAILY
Qty: 90 TABLET | Refills: 2 | Status: SHIPPED | OUTPATIENT
Start: 2023-06-16 | End: 2023-06-16

## 2023-06-16 RX ORDER — LEVONORGESTREL/ETHIN.ESTRADIOL 0.1-0.02MG
1 TABLET ORAL DAILY
Qty: 90 TABLET | Refills: 2 | Status: SHIPPED | OUTPATIENT
Start: 2023-06-16 | End: 2023-11-10

## 2023-07-26 ENCOUNTER — PATIENT OUTREACH (OUTPATIENT)
Dept: OBGYN | Facility: CLINIC | Age: 28
End: 2023-07-26
Payer: COMMERCIAL

## 2023-07-26 DIAGNOSIS — N87.1 MODERATE CERVICAL DYSPLASIA, HISTOLOGICALLY CONFIRMED: Primary | ICD-10-CM

## 2023-08-09 ENCOUNTER — APPOINTMENT (OUTPATIENT)
Dept: GENERAL RADIOLOGY | Facility: CLINIC | Age: 28
End: 2023-08-09
Attending: EMERGENCY MEDICINE
Payer: COMMERCIAL

## 2023-08-09 ENCOUNTER — HOSPITAL ENCOUNTER (EMERGENCY)
Facility: CLINIC | Age: 28
Discharge: HOME | End: 2023-08-09
Attending: EMERGENCY MEDICINE | Admitting: EMERGENCY MEDICINE
Payer: COMMERCIAL

## 2023-08-09 ENCOUNTER — NURSE TRIAGE (OUTPATIENT)
Dept: FAMILY MEDICINE | Facility: CLINIC | Age: 28
End: 2023-08-09
Payer: COMMERCIAL

## 2023-08-09 VITALS
OXYGEN SATURATION: 100 % | HEIGHT: 65 IN | HEART RATE: 85 BPM | WEIGHT: 120 LBS | RESPIRATION RATE: 14 BRPM | SYSTOLIC BLOOD PRESSURE: 106 MMHG | DIASTOLIC BLOOD PRESSURE: 74 MMHG | BODY MASS INDEX: 19.99 KG/M2

## 2023-08-09 DIAGNOSIS — R07.9 CHEST PAIN, UNSPECIFIED TYPE: ICD-10-CM

## 2023-08-09 DIAGNOSIS — F41.9 ANXIETY: ICD-10-CM

## 2023-08-09 LAB
ALBUMIN SERPL BCG-MCNC: 4.8 G/DL (ref 3.5–5.2)
ALP SERPL-CCNC: 41 U/L (ref 35–104)
ALT SERPL W P-5'-P-CCNC: 8 U/L (ref 0–50)
ANION GAP SERPL CALCULATED.3IONS-SCNC: 13 MMOL/L (ref 7–15)
AST SERPL W P-5'-P-CCNC: 12 U/L (ref 0–45)
BASOPHILS # BLD AUTO: 0.1 10E3/UL (ref 0–0.2)
BASOPHILS NFR BLD AUTO: 1 %
BILIRUB SERPL-MCNC: 0.4 MG/DL
BUN SERPL-MCNC: 12.7 MG/DL (ref 6–20)
CALCIUM SERPL-MCNC: 9.4 MG/DL (ref 8.6–10)
CHLORIDE SERPL-SCNC: 102 MMOL/L (ref 98–107)
CREAT SERPL-MCNC: 0.63 MG/DL (ref 0.51–0.95)
D DIMER PPP FEU-MCNC: <0.27 UG/ML FEU (ref 0–0.5)
DEPRECATED HCO3 PLAS-SCNC: 25 MMOL/L (ref 22–29)
EOSINOPHIL # BLD AUTO: 0.1 10E3/UL (ref 0–0.7)
EOSINOPHIL NFR BLD AUTO: 1 %
ERYTHROCYTE [DISTWIDTH] IN BLOOD BY AUTOMATED COUNT: 11.8 % (ref 10–15)
GFR SERPL CREATININE-BSD FRML MDRD: >90 ML/MIN/1.73M2
GLUCOSE SERPL-MCNC: 82 MG/DL (ref 70–99)
HCG SERPL QL: NEGATIVE
HCT VFR BLD AUTO: 41.3 % (ref 35–47)
HGB BLD-MCNC: 13.8 G/DL (ref 11.7–15.7)
HOLD SPECIMEN: NORMAL
IMM GRANULOCYTES # BLD: 0 10E3/UL
IMM GRANULOCYTES NFR BLD: 0 %
LYMPHOCYTES # BLD AUTO: 2.4 10E3/UL (ref 0.8–5.3)
LYMPHOCYTES NFR BLD AUTO: 35 %
MCH RBC QN AUTO: 31.3 PG (ref 26.5–33)
MCHC RBC AUTO-ENTMCNC: 33.4 G/DL (ref 31.5–36.5)
MCV RBC AUTO: 94 FL (ref 78–100)
MONOCYTES # BLD AUTO: 0.4 10E3/UL (ref 0–1.3)
MONOCYTES NFR BLD AUTO: 7 %
NEUTROPHILS # BLD AUTO: 3.8 10E3/UL (ref 1.6–8.3)
NEUTROPHILS NFR BLD AUTO: 56 %
NRBC # BLD AUTO: 0 10E3/UL
NRBC BLD AUTO-RTO: 0 /100
PLATELET # BLD AUTO: 384 10E3/UL (ref 150–450)
POTASSIUM SERPL-SCNC: 3.9 MMOL/L (ref 3.4–5.3)
PROT SERPL-MCNC: 7.1 G/DL (ref 6.4–8.3)
RBC # BLD AUTO: 4.41 10E6/UL (ref 3.8–5.2)
SODIUM SERPL-SCNC: 140 MMOL/L (ref 136–145)
TROPONIN T SERPL HS-MCNC: <6 NG/L
WBC # BLD AUTO: 6.7 10E3/UL (ref 4–11)

## 2023-08-09 PROCEDURE — 84703 CHORIONIC GONADOTROPIN ASSAY: CPT | Performed by: EMERGENCY MEDICINE

## 2023-08-09 PROCEDURE — 80053 COMPREHEN METABOLIC PANEL: CPT | Performed by: EMERGENCY MEDICINE

## 2023-08-09 PROCEDURE — 85025 COMPLETE CBC W/AUTO DIFF WBC: CPT | Performed by: EMERGENCY MEDICINE

## 2023-08-09 PROCEDURE — 85379 FIBRIN DEGRADATION QUANT: CPT | Performed by: EMERGENCY MEDICINE

## 2023-08-09 PROCEDURE — 99285 EMERGENCY DEPT VISIT HI MDM: CPT | Mod: 25

## 2023-08-09 PROCEDURE — 84484 ASSAY OF TROPONIN QUANT: CPT | Performed by: EMERGENCY MEDICINE

## 2023-08-09 PROCEDURE — 71046 X-RAY EXAM CHEST 2 VIEWS: CPT

## 2023-08-09 PROCEDURE — 36415 COLL VENOUS BLD VENIPUNCTURE: CPT | Performed by: EMERGENCY MEDICINE

## 2023-08-09 PROCEDURE — 93005 ELECTROCARDIOGRAM TRACING: CPT

## 2023-08-09 NOTE — ED TRIAGE NOTES
Pt comes in for increased anxiety and chest tightness for last 2-3 weeks. Says she has had  chest tightness before with anxiety yet she has never had it this long before. Also feels lightheaded and dizzy at times.

## 2023-08-09 NOTE — TELEPHONE ENCOUNTER
"Pt calling with 2x weeks of constant chest pain, pain to shoulder, mild SOB during long conversations, tachycardia with  bpm, 3-5x emesis, intermittent lightheadedness, diaphoresis under arms. Pt denies fever, HA, HTN.    Disposition is to ED now. Pt states she will leave work and go to ED.    MARIA L MANNIGN RN on 8/9/2023 at 12:48 PM    Reason for Disposition   Difficulty breathing   Pain also in shoulder(s) or arm(s) or jaw    Additional Information   Negative: SEVERE difficulty breathing (e.g., struggling for each breath, speaks in single words)   Negative: Passed out (i.e., fainted, collapsed and was not responding)   Negative: Difficult to awaken or acting confused (e.g., disoriented, slurred speech)   Negative: Shock suspected (e.g., cold/pale/clammy skin, too weak to stand, low BP, rapid pulse)   Negative: Chest pain lasting longer than 5 minutes and ANY of the following:* Over 44 years old* Over 30 years old and at least one cardiac risk factor (e.g., diabetes mellitus, high blood pressure, high cholesterol, smoker, or strong family history of heart disease)* History of heart disease (i.e., angina, heart attack, heart failure, bypass surgery, takes nitroglycerin)* Pain is crushing, pressure-like, or heavy   Negative: Heart beating < 50 beats per minute OR > 140 beats per minute   Negative: Visible sweat on face or sweat dripping down face   Negative: Sounds like a life-threatening emergency to the triager   Negative: Followed an injury to chest   Negative: SEVERE chest pain    Answer Assessment - Initial Assessment Questions  1. DESCRIPTION: \"Please describe your heart rate or heartbeat that you are having\" (e.g., fast/slow, regular/irregular, skipped or extra beats, \"palpitations\")      Very fast,  today   2. ONSET: \"When did it start?\" (Minutes, hours or days)       Two weeks ago  3. DURATION: \"How long does it last\" (e.g., seconds, minutes, hours)      Constant tachycardia   4. PATTERN \"Does " "it come and go, or has it been constant since it started?\"  \"Does it get worse with exertion?\"   \"Are you feeling it now?\"      constant    6. HEART RATE: \"Can you tell me your heart rate?\" \"How many beats in 15 seconds?\"  (Note: not all patients can do this)        111 bpm  7. RECURRENT SYMPTOM: \"Have you ever had this before?\" If Yes, ask: \"When was the last time?\" and \"What happened that time?\"       Yes but not this bad or this long  Hx of panic attacks where pt would pass out  8. CAUSE: \"What do you think is causing the palpitations?\"      Anxiety, no new stressors lately  9. CARDIAC HISTORY: \"Do you have any history of heart disease?\" (e.g., heart attack, angina, bypass surgery, angioplasty, arrhythmia)       none  10. OTHER SYMPTOMS: \"Do you have any other symptoms?\" (e.g., dizziness, chest pain, sweating, difficulty breathing)        Lightheaded intermittently throughout day, feels SOB during long conversations, sweating more than usual, chest pain in center of chest that feels  11. PREGNANCY: \"Is there any chance you are pregnant?\" \"When was your last menstrual period?\"        None, LMP 1 week ago    Answer Assessment - Initial Assessment Questions  1. LOCATION: \"Where does it hurt?\"        Center of chest  2. RADIATION: \"Does the pain go anywhere else?\" (e.g., into neck, jaw, arms, back)      Radiates to neck  3. ONSET: \"When did the chest pain begin?\" (Minutes, hours or days)       Last week  4. PATTERN \"Does the pain come and go, or has it been constant since it started?\"  \"Does it get worse with exertion?\"       Pain is constant but spikes intermittently  5. DURATION: \"How long does it last\" (e.g., seconds, minutes, hours)      hours  6. SEVERITY: \"How bad is the pain?\"  (e.g., Scale 1-10; mild, moderate, or severe)     - MILD (1-3): doesn't interfere with normal activities      - MODERATE (4-7): interferes with normal activities or awakens from sleep     - SEVERE (8-10): excruciating pain, unable to do " "any normal activities        mild  7. CARDIAC RISK FACTORS: \"Do you have any history of heart problems or risk factors for heart disease?\" (e.g., angina, prior heart attack; diabetes, high blood pressure, high cholesterol, smoker, or strong family history of heart disease)      none  8. PULMONARY RISK FACTORS: \"Do you have any history of lung disease?\"  (e.g., blood clots in lung, asthma, emphysema, birth control pills)      none  9. CAUSE: \"What do you think is causing the chest pain?\"      anxiety  10. OTHER SYMPTOMS: \"Do you have any other symptoms?\" (e.g., dizziness, nausea, vomiting, sweating, fever, difficulty breathing, cough)        Emesis 5x in past 2 weeks, sweating, lightheadedness  11. PREGNANCY: \"Is there any chance you are pregnant?\" \"When was your last menstrual period?\"        none    Protocols used: Heart Rate and Heartbeat [id number], Chest Pain-A-OH    "

## 2023-08-10 LAB
ATRIAL RATE - MUSE: 95 BPM
DIASTOLIC BLOOD PRESSURE - MUSE: NORMAL MMHG
INTERPRETATION ECG - MUSE: NORMAL
P AXIS - MUSE: 50 DEGREES
PR INTERVAL - MUSE: 132 MS
QRS DURATION - MUSE: 84 MS
QT - MUSE: 340 MS
QTC - MUSE: 427 MS
R AXIS - MUSE: 94 DEGREES
SYSTOLIC BLOOD PRESSURE - MUSE: NORMAL MMHG
T AXIS - MUSE: 13 DEGREES
VENTRICULAR RATE- MUSE: 95 BPM

## 2023-08-10 NOTE — ED PROVIDER NOTES
"  History     Chief Complaint:  Anxiety       HPI   Yue Carbone is a 28 year old female who came in for further evaluation of anxiety and chest pain.  She says she has a history of anxiety and has had similar symptoms in the past with her anxiety, however her current symptoms have lasted much longer than usual.  She has chest pain and some shortness of breath with it that has been present for the last 2 to 3 weeks.  She denies any nausea, vomiting, fevers, cough, or any other symptoms, other than a slight neck discomfort.  She called the nurse line, and they recommended she come in for evaluation.  She used to take hydroxyzine for her anxiety, however that just makes her sleepy.  Therefore she has not been taking it recently.  She does have a therapist, who is providing her a list of potential psychologist or psychiatrist for her as well.  She denies any suicidal ideation.  She does smoke marijuana but not significantly.      Independent Historian:   None - Patient Only    Review of External Notes:   Nurse triage phone note from today.      Medications:    Abilify  Depakote ER  Spironolactone    Past Medical History:    Anxiety    Past Surgical History:    Past Surgical History:   Procedure Laterality Date    NO HISTORY OF SURGERY  02/10/2023        Physical Exam   Patient Vitals for the past 24 hrs:   BP Pulse Resp SpO2 Height Weight   08/09/23 1626 106/74 93 14 100 % 1.638 m (5' 4.5\") 54.4 kg (120 lb)        Physical Exam  Nursing note and vitals reviewed.  Constitutional:  Oriented to person, place, and time. Cooperative.   HENT:   Nose:    Nose normal.   Mouth/Throat:   Mucous membranes are normal.   Eyes:    Conjunctivae normal and EOM are normal.      Pupils are equal, round, and reactive to light.   Neck:    Trachea normal.   Cardiovascular:  Normal rate, regular rhythm, normal heart sounds and normal pulses. No murmur heard.  Pulmonary/Chest:  Effort normal and breath sounds normal.   Abdominal: "   Soft. Normal appearance and bowel sounds are normal.      There is no tenderness.      There is no rebound and no CVA tenderness.   Musculoskeletal:  Extremities atraumatic x 4.   Lymphadenopathy:  No cervical adenopathy.   Neurological:   Alert and oriented to person, place, and time. Normal strength.      No cranial nerve deficit or sensory deficit. GCS eye subscore is 4. GCS verbal subscore is 5. GCS motor subscore is 6.   Skin:    Skin is intact. No rash noted.   Psychiatric:   Somewhat anxious but otherwise normal mood and affect.      Emergency Department Course   ECG  ECG taken at 1615, ECG read at 1939  Normal sinus rhythm  Rightward axis  Borderline ECG   Rate 95 bpm. NY interval 132 ms. QRS duration 84 ms. QT/QTc 340/427 ms. P-R-T axes 50 94 13.       Imaging:  XR Chest 2 Views   Final Result   IMPRESSION: Negative chest.         Report per radiology    Laboratory:  Labs Ordered and Resulted from Time of ED Arrival to Time of ED Departure   COMPREHENSIVE METABOLIC PANEL - Normal       Result Value    Sodium 140      Potassium 3.9      Chloride 102      Carbon Dioxide (CO2) 25      Anion Gap 13      Urea Nitrogen 12.7      Creatinine 0.63      Calcium 9.4      Glucose 82      Alkaline Phosphatase 41      AST 12      ALT 8      Protein Total 7.1      Albumin 4.8      Bilirubin Total 0.4      GFR Estimate >90     HCG QUALITATIVE PREGNANCY - Normal    hCG Serum Qualitative Negative     TROPONIN T, HIGH SENSITIVITY - Normal    Troponin T, High Sensitivity <6     D DIMER QUANTITATIVE - Normal    D-Dimer Quantitative <0.27     CBC WITH PLATELETS AND DIFFERENTIAL    WBC Count 6.7      RBC Count 4.41      Hemoglobin 13.8      Hematocrit 41.3      MCV 94      MCH 31.3      MCHC 33.4      RDW 11.8      Platelet Count 384      % Neutrophils 56      % Lymphocytes 35      % Monocytes 7      % Eosinophils 1      % Basophils 1      % Immature Granulocytes 0      NRBCs per 100 WBC 0      Absolute Neutrophils 3.8       Absolute Lymphocytes 2.4      Absolute Monocytes 0.4      Absolute Eosinophils 0.1      Absolute Basophils 0.1      Absolute Immature Granulocytes 0.0      Absolute NRBCs 0.0          Procedures       Emergency Department Course & Assessments:             Interventions:  Medications - No data to display       Independent Interpretation (X-rays, CTs, rhythm strip):  I reviewed the patient's chest x-ray and agree with the reading of no infiltrate.    Consultations/Discussion of Management or Tests:  None        Social Determinants of Health affecting care:   None    Disposition:  The patient was discharged to home.     Impression & Plan    Medical Decision Making:  This is a 28-year-old female who came in for further evaluation of chest pain and anxiety.  She was mainly concerned due to the fact that she has never had her symptoms last this long.  I felt it was reasonable to proceed with the above workup here including blood work, EKG, and chest x-ray.  With a negative D-dimer, I feel that pulmonary embolism has been sufficiently ruled out.  Her EKG and the rest of her blood work are also unremarkable, as is her chest x-ray.  Therefore I suspect that her symptoms are in fact consistent with her anxiety.  She is to follow-up with her physician and the recommendations per her therapist.  She should return here with any concerns or worsening symptoms as well.        Diagnosis:    ICD-10-CM    1. Chest pain, unspecified type  R07.9       2. Anxiety  F41.9            Discharge Medications:  New Prescriptions    No medications on file            8/9/2023   Zac Perales MD Lashkowitz, Seth H, MD  08/09/23 9668

## 2023-10-26 NOTE — TELEPHONE ENCOUNTER
FYI to provider - Patient is lost to pap tracking follow-up. Attempts to contact pt have been made per reminder process and there has been no reply and/or no appt scheduled. Contact hx listed below.     11/30/22 ASC-[facility] . Plan colp due by 2/28/23 1/11/23 COLP - MURIEL 2. ECC- MURIEL 2. Plan LEEP  2/10/23 LEEP Bx & ECC - negative. Plan cotest in 6 months.   7/26/23 Reminder mychart  9/5/23 Appt- no show  9/28/23 Reminder call-LM  10/26/23 Lost to follow up

## 2023-11-04 ENCOUNTER — HEALTH MAINTENANCE LETTER (OUTPATIENT)
Age: 28
End: 2023-11-04

## 2023-11-10 ENCOUNTER — OFFICE VISIT (OUTPATIENT)
Dept: MIDWIFE SERVICES | Facility: CLINIC | Age: 28
End: 2023-11-10
Payer: COMMERCIAL

## 2023-11-10 VITALS
DIASTOLIC BLOOD PRESSURE: 58 MMHG | HEIGHT: 65 IN | BODY MASS INDEX: 21.16 KG/M2 | WEIGHT: 127 LBS | SYSTOLIC BLOOD PRESSURE: 100 MMHG

## 2023-11-10 DIAGNOSIS — Z12.4 SCREENING FOR MALIGNANT NEOPLASM OF CERVIX: ICD-10-CM

## 2023-11-10 DIAGNOSIS — N89.8 VAGINAL ODOR: ICD-10-CM

## 2023-11-10 DIAGNOSIS — F41.1 GAD (GENERALIZED ANXIETY DISORDER): ICD-10-CM

## 2023-11-10 DIAGNOSIS — Z01.419 WELL WOMAN EXAM: Primary | ICD-10-CM

## 2023-11-10 DIAGNOSIS — Z11.3 ROUTINE SCREENING FOR STI (SEXUALLY TRANSMITTED INFECTION): ICD-10-CM

## 2023-11-10 LAB
CLUE CELLS: ABNORMAL
TRICHOMONAS, WET PREP: ABNORMAL
WBC'S/HIGH POWER FIELD, WET PREP: ABNORMAL
YEAST, WET PREP: ABNORMAL

## 2023-11-10 PROCEDURE — 87210 SMEAR WET MOUNT SALINE/INK: CPT | Performed by: ADVANCED PRACTICE MIDWIFE

## 2023-11-10 PROCEDURE — 87491 CHLMYD TRACH DNA AMP PROBE: CPT | Performed by: ADVANCED PRACTICE MIDWIFE

## 2023-11-10 PROCEDURE — 99212 OFFICE O/P EST SF 10 MIN: CPT | Mod: 25 | Performed by: ADVANCED PRACTICE MIDWIFE

## 2023-11-10 PROCEDURE — 99395 PREV VISIT EST AGE 18-39: CPT | Performed by: ADVANCED PRACTICE MIDWIFE

## 2023-11-10 PROCEDURE — G0145 SCR C/V CYTO,THINLAYER,RESCR: HCPCS | Performed by: ADVANCED PRACTICE MIDWIFE

## 2023-11-10 PROCEDURE — 87624 HPV HI-RISK TYP POOLED RSLT: CPT | Performed by: ADVANCED PRACTICE MIDWIFE

## 2023-11-10 RX ORDER — HYDROXYZINE HYDROCHLORIDE 25 MG/1
25 TABLET, FILM COATED ORAL EVERY 4 HOURS PRN
COMMUNITY
Start: 2023-04-24 | End: 2024-02-05 | Stop reason: DRUGHIGH

## 2023-11-10 ASSESSMENT — ANXIETY QUESTIONNAIRES
2. NOT BEING ABLE TO STOP OR CONTROL WORRYING: MORE THAN HALF THE DAYS
GAD7 TOTAL SCORE: 14
1. FEELING NERVOUS, ANXIOUS, OR ON EDGE: MORE THAN HALF THE DAYS
5. BEING SO RESTLESS THAT IT IS HARD TO SIT STILL: SEVERAL DAYS
3. WORRYING TOO MUCH ABOUT DIFFERENT THINGS: NEARLY EVERY DAY
7. FEELING AFRAID AS IF SOMETHING AWFUL MIGHT HAPPEN: MORE THAN HALF THE DAYS
GAD7 TOTAL SCORE: 14
6. BECOMING EASILY ANNOYED OR IRRITABLE: MORE THAN HALF THE DAYS

## 2023-11-10 ASSESSMENT — PATIENT HEALTH QUESTIONNAIRE - PHQ9
5. POOR APPETITE OR OVEREATING: MORE THAN HALF THE DAYS
SUM OF ALL RESPONSES TO PHQ QUESTIONS 1-9: 14

## 2023-11-10 NOTE — PROGRESS NOTES
"Yue is a 28 year old  female who presents for annual exam.     Besides routine health maintenance,  she would like to discuss starting medication for her mood.  HPI:      Yue reports significant changes in her life over the past 2 yrs that have caused stress. She has been on medication in the past including Depakote, Abilify, citalopram, ativan, xanax. Was seeing psych through private practice. Been a little over a year since she last saw someone. Not currently taking anything for mood. She states she was dx with anxiety and bipolar, but doesn't feel like the bipolar diagnosis really fits with how she is feeling. She sees a therapist regularly.   Has noticed abnormal vaginal odor and wonders if she may have an infection.   Menses are regular q 28-30 days and normal lasting 7 days.   Menses flow: normal.    Patient's last menstrual period was 10/30/2023 (approximate)..   Using none for contraception.    She is not currently considering pregnancy.    REPRODUCTIVE/GYNECOLOGIC HISTORY:  Yue is not sexually active  STI testing offered?  Accepted  History of abnormal Pap smear:  Yes had Leep  SOCIAL HISTORY  Abuse: does not report having previously been physical or sexually abused.    Do you feel safe in your environment? YES       She  reports that she has never smoked. She has never used smokeless tobacco.      Last PHQ-9 score on record =       11/10/2023     2:48 PM   PHQ-9 SCORE   PHQ-9 Total Score 14     Last GAD7 score on record =       11/10/2023     2:49 PM   BRADLEY-7 SCORE   Total Score 14     Alcohol Score = 0-1 drinks/week     HEALTH MAINTENANCE:  Cholesterol: (No results found for: \"CHOL\" History of abnormal lipids: No  Mammo: NA . History of abnormal Mammo: Not applicable.  Regular Self Breast Exams:no  TSH: (  TSH   Date Value Ref Range Status   2021 0.65 0.40 - 4.00 mU/L Final    )  Pap; (No results found for: \"PAP\" )  Immunizations up to date: no  (Gardasil, Tdap, Flu)  Health maintenance " updated:  yes    HEALTHY HABITS  Eating habits: eats regular meals  Calcium/Vitamin D intake: source:  dairy Adequate?   Exercise: How often do you exercise?sometimes   Have you had an eye exam in the last two years? YES     Do you routinely see the dentist once or twice yearly? YES         HISTORY:  OB History    Para Term  AB Living   0 0 0 0 0 0   SAB IAB Ectopic Multiple Live Births   0 0 0 0 0     Past Medical History:   Diagnosis Date    Known health problems: none 02/10/2023     Past Surgical History:   Procedure Laterality Date    NO HISTORY OF SURGERY  02/10/2023     Family History   Problem Relation Age of Onset    Family History Negative Mother     Family History Negative Father     Breast Cancer Maternal Grandmother      Social History     Socioeconomic History    Marital status: Single     Spouse name: None    Number of children: None    Years of education: None    Highest education level: None   Tobacco Use    Smoking status: Never    Smokeless tobacco: Never   Vaping Use    Vaping Use: Some days   Substance and Sexual Activity    Alcohol use: Yes    Drug use: Never    Sexual activity: Yes     Partners: Male     Birth control/protection: OCP   Other Topics Concern    Parent/sibling w/ CABG, MI or angioplasty before 65F 55M? No       Current Outpatient Medications:     hydrOXYzine (ATARAX) 25 MG tablet, Take 25 mg by mouth every 4 hours as needed for anxiety, Disp: , Rfl:     spironolactone (ALDACTONE) 100 MG tablet, Take 100 mg by mouth At Bedtime, Disp: , Rfl:      Allergies   Allergen Reactions    Seasonal Allergies        Past medical, surgical, social and family history were reviewed and updated in [facility].    ROS:   CONSTITUTIONAL: NEGATIVE for fever, chills, change in weight  INTEGUMENTARY/SKIN: NEGATIVE for worrisome rashes, moles or lesions  EYES: NEGATIVE for vision changes or irritation  ENT: NEGATIVE for ear, mouth and throat problems  RESP: NEGATIVE for significant cough or  "SOB  BREAST: NEGATIVE for masses, tenderness or discharge  CV: NEGATIVE for chest pain, palpitations or peripheral edema  GI: NEGATIVE for nausea, abdominal pain, heartburn, or change in bowel habits  : NEGATIVE for unusual urinary or vaginal symptoms. No vaginal bleeding. Positive for vaginal odor.   MUSCULOSKELETAL: NEGATIVE for significant arthralgias or myalgia  NEURO: NEGATIVE for weakness, dizziness or paresthesias  PSYCHIATRIC: positive for anxiety     PHYSICAL EXAM:  /58   Ht 1.651 m (5' 5\")   Wt 57.6 kg (127 lb)   LMP 10/30/2023 (Approximate)   BMI 21.13 kg/m     BMI: Body mass index is 21.13 kg/m .  Constitutional: healthy, alert and no distress  Neck: symmetrical, thyroid normal size, no masses present, no lymphadenopathy present.   Cardiovascular: RRR, no murmurs present  Respiratory: breathing unlabored, lungs CTA bilaterally  Breast:normal without masses, tenderness or nipple discharge and no palpable axillary masses or adenopathy  Gastrointestinal: abdomen soft, non-tender, bowel sounds present  PELVIC EXAM:  Vulva: No lesions, no adenopathy, BUS WNL  Vagina: Moist, pink, discharge normal  well rugated, no lesions  Cervix:smooth, pink, no visible lesions  Rectal exam: deferred    ASSESSMENT/PLAN:    ICD-10-CM    1. Well woman exam  Z01.419 NEISSERIA GONORRHOEA PCR     CHLAMYDIA TRACHOMATIS PCR     Wet prep - Clinic Collect      2. Routine screening for STI (sexually transmitted infection)  Z11.3 NEISSERIA GONORRHOEA PCR     CHLAMYDIA TRACHOMATIS PCR      3. Screening for malignant neoplasm of cervix  Z12.4 Pap screen reflex to HPV if ASCUS - recommend age 25 - 29      4. BRADLEY (generalized anxiety disorder)  F41.1 Adult Mental Health  Referral      5. Vaginal odor  N89.8 Wet prep - Clinic Collect        No results found for any visits on 11/10/23.    - recommended evaluation and med management by psych given hx possible dx bipolar and multiple failed trials of medication. Referral " placed. Also placed referral for Lottie Grimes [facility]. Continue seeing therapist.     - treatment pending wet prep results     COUNSELING:   Reviewed preventive health counseling, as reflected in patient instructions   reports that she has never smoked. She has never used smokeless tobacco.      MALLORIE Szymanski, CNM

## 2023-11-10 NOTE — NURSING NOTE
"Chief Complaint   Patient presents with    Physical     Pap as well       Initial /58   Ht 1.651 m (5' 5\")   Wt 57.6 kg (127 lb)   LMP 10/30/2023 (Approximate)   BMI 21.13 kg/m   Estimated body mass index is 21.13 kg/m  as calculated from the following:    Height as of this encounter: 1.651 m (5' 5\").    Weight as of this encounter: 57.6 kg (127 lb).  BP completed using cuff size: regular    Questioned patient about current smoking habits.  Pt. has never smoked.          Chichi Lincoln LPN on 11/10/2023 at 2:10 PM             "

## 2023-11-11 LAB — C TRACH DNA SPEC QL NAA+PROBE: NEGATIVE

## 2023-11-13 ENCOUNTER — TELEPHONE (OUTPATIENT)
Dept: BEHAVIORAL HEALTH | Facility: CLINIC | Age: 28
End: 2023-11-13
Payer: COMMERCIAL

## 2023-11-13 NOTE — TELEPHONE ENCOUNTER
Outreach made to pt to offer [facility] appt per the request of Chichi ARRIAGA. Left voicemail with number for scheduling. Mark[employer] message also sent.    BELÉN Aquino, [facility]  Behavioral Health Clinician  [facility]

## 2023-11-15 LAB
BKR LAB AP GYN ADEQUACY: NORMAL
BKR LAB AP GYN INTERPRETATION: NORMAL
BKR LAB AP HPV REFLEX: NORMAL
BKR LAB AP LMP: NORMAL
BKR LAB AP PREVIOUS ABNORMAL: NORMAL
PATH REPORT.COMMENTS IMP SPEC: NORMAL
PATH REPORT.COMMENTS IMP SPEC: NORMAL
PATH REPORT.RELEVANT HX SPEC: NORMAL

## 2023-11-17 LAB
HUMAN PAPILLOMA VIRUS 16 DNA: NEGATIVE
HUMAN PAPILLOMA VIRUS 18 DNA: NEGATIVE
HUMAN PAPILLOMA VIRUS FINAL DIAGNOSIS: NORMAL
HUMAN PAPILLOMA VIRUS OTHER HR: NEGATIVE

## 2023-11-20 ENCOUNTER — PATIENT OUTREACH (OUTPATIENT)
Dept: OBGYN | Facility: CLINIC | Age: 28
End: 2023-11-20
Payer: COMMERCIAL

## 2024-02-05 ENCOUNTER — NURSE TRIAGE (OUTPATIENT)
Dept: NURSING | Facility: CLINIC | Age: 29
End: 2024-02-05

## 2024-02-05 ENCOUNTER — VIRTUAL VISIT (OUTPATIENT)
Dept: FAMILY MEDICINE | Facility: CLINIC | Age: 29
End: 2024-02-05
Payer: COMMERCIAL

## 2024-02-05 DIAGNOSIS — H53.9 VISION CHANGES: Primary | ICD-10-CM

## 2024-02-05 PROCEDURE — 99213 OFFICE O/P EST LOW 20 MIN: CPT | Mod: 95 | Performed by: PHYSICIAN ASSISTANT

## 2024-02-05 RX ORDER — HYDROXYZINE PAMOATE 100 MG
1 CAPSULE ORAL 3 TIMES DAILY
COMMUNITY
Start: 2024-01-05 | End: 2024-04-12

## 2024-02-05 ASSESSMENT — ENCOUNTER SYMPTOMS: EYE PAIN: 1

## 2024-02-05 NOTE — TELEPHONE ENCOUNTER
Triage call  Patient calling to report she is having a Static  like on a TV that is on the far walls.  She states it started a weel or so ago  she has anxiety and not sure if that is the cause but she does not have a appointment with her counselor till march.        Per protocol see in office today.  Care advice given.  Verbalizes understanding and agrees with plan. Transferred to scheduling.    Amy Agarwal RN   [facility] Nurse Advisor  2:20 PM 2/5/2024     Reason for Disposition   Patient wants to be seen    Additional Information   Negative: Weakness of the face, arm or leg on one side of the body   Negative: Followed getting substance in the eye   Negative: Foreign body stuck in the eye   Negative: Followed an eye injury   Negative: Followed sun lamp or sun exposure (UV keratitis)   Negative: Yellow or green discharge (pus) in the eye   Negative: Pregnant   Negative: Complete loss of vision in one or both eyes   Negative: SEVERE eye pain   Negative: SEVERE headache   Negative: Double vision   Negative: Blurred vision or visual changes and present now and sudden onset or new (e.g., minutes, hours, days)  (Exception: Seeing floaters / black specks OR previously diagnosed migraine headaches with same symptoms.)   Negative: Patient sounds very sick or weak to the triager   Negative: Flashes of light  (Exception: Brief from pressing on the eyeball.)   Negative: Many floaters in the eye (Exception: Floater(s) are a chronic symptom and this is unchanged from patient's baseline pattern.)   Negative: Eye pain and brief (now gone) blurred vision or visual changes   Negative: Taking digoxin (e.g., Lanoxin, Digitek, Cardoxin, Lanoxicaps; Toloxin in [place]) and blurred vision, yellow vision, or yellow-green halos   Negative: Jaw pain while eating and age > 50 years   Negative: Headache and age > 50 years    Protocols used: Vision Loss or Change-A-OH

## 2024-02-05 NOTE — PROGRESS NOTES
"Yue is a 28 year old who is being evaluated via a billable video visit.      How would you like to obtain your AVS? MyChart  If the video visit is dropped, the invitation should be resent by: Text to cell phone: [phone]  Will anyone else be joining your video visit? No          Assessment & Plan     Vision changes  Unclear etiology of patient's symptoms, however we did discussed could be related to her anxiety.  No red flags observed during office visit today.  I recommend further evaluation with a ophthalmologist.  Follow-up in clinic as needed.  Patient agrees with this plan and has no further questions.  - Adult Eye  Referral; Future            Nicotine/Tobacco Cessation  She reports that she has been smoking cigarettes. She has been smoking an average of 0.3 packs per day. She has never used smokeless tobacco.  Nicotine/Tobacco Cessation Plan  Not discussed during visit          Subjective   Yue is a 28 year old, presenting for the following health issues:  Eye Problem    History of Present Illness       Reason for visit:  Vision problem, no headache.  Symptom onset:  More than a month  Symptoms include:  A few minutes of \"static vision\". Wears glasses and just had an eye exam last year.  Symptom intensity:  Severe  Symptom progression:  Worsening  Had these symptoms before:  Yes  What makes it worse:  Being indoor  What makes it better:  Being outdoor      Additional provider notes :    She has a long history of anxiety which has shown in physical symptoms. Sometime SOB, chest pain, sweating, jittery. Now she is has static like vision. Feels like a bunch a little dots that are softener together. Its worse when things are closer. It seems to only happen when things are solid color. Better when things are far away. No changes in color. Not impacting her driving. History of drug abuse.        Review of Systems  Constitutional, neuro, ENT, endocrine, pulmonary, cardiac, gastrointestinal, " genitourinary, musculoskeletal, integument and psychiatric systems are negative, except as otherwise noted.      Objective    Vitals - Patient Reported  Pain Score: Moderate Pain (5)        Physical Exam   GENERAL: alert and no distress  EYES: Eyes grossly normal to inspection.  No discharge or erythema, or obvious scleral/conjunctival abnormalities.  RESP: No audible wheeze, cough, or visible cyanosis.    PSYCH: Appropriate affect, tone, and pace of words          Video-Visit Details    Type of service:  Video Visit     Originating Location (pt. Location): Home    Distant Location (provider location):  Off-site  Platform used for Video Visit: Cortes  Signed Electronically by: TAWANNA Jenkins

## 2024-02-06 ENCOUNTER — TELEPHONE (OUTPATIENT)
Dept: OPHTHALMOLOGY | Facility: CLINIC | Age: 29
End: 2024-02-06
Payer: COMMERCIAL

## 2024-02-06 NOTE — TELEPHONE ENCOUNTER
S/s of visual snow per my review.    Recommend scheduling with Dr. Da Silva in Neuro-Optometry.    Note to pt communicator to assist in scheduling.    If pt feels needs sooner scheduling-- ok to direct back to triage.    Kenny Velazquez RN 12:55 PM 02/06/24

## 2024-02-06 NOTE — TELEPHONE ENCOUNTER
Called and spoke to Yue     Explained her referral     Added her appointment to the wait list     Made her an appointment for 3/25    Discussed wait time     Billing / insurance     Parking / cost     Kim Warner Communication Facilitator on 2/6/2024 at 1:45 PM     Daily Progress Note:     Date of Service: 1/23/2020  Primary Team: [facility] Purple Team   Attending: Jose Lange M.D.   Senior Resident: Dr. Andersen  Intern: Dr. Urbano  Contact:  [phone]    Chief Complaint:   Right thigh wound Infection.  MSSA Bacteremia.  Hep C Infection.    Subjective:  - No events overnight.   - Patient was complaining pain and redness over the left upper arm.   - case discussed with Ortho, might plan graft and wound is too big to be taken care of at SNF.  - patient is medically cleared waiting ortho clearness.     Consultants/Specialty:  Orthopaedic  Surgery  Critical Care  Wound care    Review of Systems:    Review of Systems   Constitutional: Positive for malaise/fatigue. Negative for chills, diaphoresis and fever.   HENT: Negative for hearing loss, nosebleeds, sinus pain, sore throat and tinnitus.    Eyes: Negative for blurred vision, double vision and photophobia.   Respiratory: Negative for cough, hemoptysis, sputum production and shortness of breath.    Cardiovascular: Negative for chest pain, palpitations and orthopnea.   Gastrointestinal: Negative for abdominal pain, heartburn, nausea and vomiting.   Genitourinary: Negative for dysuria, frequency, hematuria and urgency.   Musculoskeletal: Negative for back pain, myalgias and neck pain.   Skin: Negative for itching and rash.   Neurological: Negative for dizziness, speech change, focal weakness, weakness and headaches.   Endo/Heme/Allergies: Does not bruise/bleed easily.   Psychiatric/Behavioral: Negative for depression, hallucinations and suicidal ideas.       Objective Data:   Physical Exam:   Vitals:   Temp:  [36.2 °C (97.1 °F)-36.6 °C (97.9 °F)] 36.3 °C (97.4 °F)  Pulse:  [72-81] 79  Resp:  [16-18] 17  BP: (100-111)/(58-64) 104/58  SpO2:  [96 %-98 %] 98 %     Physical Exam  Constitutional:       General: He is not in acute distress.     Appearance: Normal appearance. He is not diaphoretic.   HENT:      Head:  Normocephalic and atraumatic.      Nose: Nose normal. No congestion or rhinorrhea.      Mouth/Throat:      Mouth: Mucous membranes are moist.      Pharynx: Oropharynx is clear. No oropharyngeal exudate or posterior oropharyngeal erythema.   Eyes:      General: No scleral icterus.     Extraocular Movements: Extraocular movements intact.      Conjunctiva/sclera: Conjunctivae normal.      Pupils: Pupils are equal, round, and reactive to light.   Neck:      Musculoskeletal: Normal range of motion and neck supple. No neck rigidity or muscular tenderness.   Cardiovascular:      Rate and Rhythm: Normal rate and regular rhythm.      Heart sounds: No murmur.   Pulmonary:      Effort: Pulmonary effort is normal. No respiratory distress.      Breath sounds: Normal breath sounds. No stridor. No wheezing, rhonchi or rales.   Abdominal:      General: Bowel sounds are normal. There is no distension.      Palpations: There is no mass.      Tenderness: There is no tenderness. There is no guarding or rebound.   Genitourinary:     Comments: Large left Inguinal hernia.  Musculoskeletal: Normal range of motion.         General: No swelling, tenderness, deformity or signs of injury.      Comments: Wound vac right upper thigh, no signs of infection.  Redness and tenderness over the left upper arm.   Skin:     General: Skin is warm.      Capillary Refill: Capillary refill takes less than 2 seconds.      Coloration: Skin is not jaundiced.      Findings: No rash.   Neurological:      General: No focal deficit present.      Mental Status: He is alert and oriented to person, place, and time. Mental status is at baseline.      Cranial Nerves: No cranial nerve deficit.   Psychiatric:         Mood and Affect: Mood normal.         Thought Content: Thought content normal.         Judgment: Judgment normal.           Labs:   Review    Imaging:   Review    * Septic shock due to Gram positive bacteria (HCC)- (present on admission)  Assessment &  Plan  Resolved   This is Sepsis Present on admission  SIRS criteria identified on my evaluation include: Tachycardia, with heart rate greater than 90 BPM and Leukocyosis, with WBC greater than 12,000  Source is right lower extremity, bacteremia  Sepsis protocol initiated  Fluid resuscitation ordered per protocol  IV antibiotics were ordered    Maintenance fluid as above  Phenylephrine GTT - has not required for over 24 hours  Cultures pending - repeat blood cultures NGTD  Antibiotics to cover necrotizing process, MSSA  See necrotizing fasciitis problem.    * Sepsis Resolved  - CTM  - Vital signs stable        MSSA bacteremia  Assessment & Plan  - MSSA Bacteremia One bottle  - Second Culture taken 18t h no Growth do date  - CTM  - Midline was placed 1/21 afternoon.  - Patient will continue antibiotics in SNF in [place] in the next following days.    Necrotizing fasciitis of pelvic region and thigh (HCC)- (present on admission)  Assessment & Plan  -  Patient presented with large abscess (40cm craniocaudially, 4x10cm in other dimensions) in his right lateral thigh and a somewhat subacute history of increasing pain, fever, and chills after a ground level fall where he injured the area. - He had elevated WBC.  - Initially started on Meropenem and Linezolid on admission, went to the OR evening of 1/16/2019 with extensive debridement confirming necrotizing infection.   - Cultures were obtained and a wound vac was placed.   - Subsequently gram stain showed gram positive cocci consistent with streptococcus,    -  Cefazolin and Linezolid after above finding  - Cultures positive for viridans strep in wound.  - 3rd I&D with change of wound vac on 1/20  - No more I&D planned by Surgery  - Continues Sinus Rhythm   - Midline placed   Plan:  - Waiting Ortho clearness to DC to SNF.  - Continue Cefazolin  - ID following   - Pain control  - CTM  - Continue IV Abx  - SNF referral     Unilateral recurrent inguinal hernia without obstruction  or gangrene  Assessment & Plan  - Large left Inguinal Hernia  - Bowel contents in scrotum  - Seen by Surgery today 1/21  - General surgery recommended outpatient follow up for elective surgery once better from infection.              PLAN:  - Scrotal sling  - No heavy lifting  - Outpatient follow up with surgery after recovery from this hospitalization      Atrial fibrillation (HCC)  Assessment & Plan  - Resolved  - A Fib on 1/20 afternoon, was given Metoprolol, Amiodarone loading dose and Drip.  - Converted back to Sinus Rhythm today 1/21 at 3:52 am.  - Morning EKG SR  - On Tele, patient continues Sinus Rhythm 1/22                       PLAN:  - CTM  - Transfer to Medical floor  - On Tele  - Oral Amiodarone      Hyponatremia- (present on admission)  Assessment & Plan  - Mild Hyponatremia 1/22, no change,  Sodium 134  - No CNS symptoms  - Improving   - CTM    Thrombocytosis (HCC)- (present on admission)  Assessment & Plan  CTM    Normochromic anemia- (present on admission)  Assessment & Plan  CTM       Smoking- (present on admission)  Assessment & Plan         Chronic hepatitis C virus infection (HCC)- (present on admission)  Assessment & Plan  - Patient was antibody positive with viral load 6.2 million.   - Transaminases normal, ALP slightly high.    Plan:  - Outpatient follow-up,   - Abstention from further drug use  - Establish care with PCP  - Regular follow up    Cannabis use disorder, mild, abuse- (present on admission)  Assessment & Plan   patient        Homeless- (present on admission)  Assessment & Plan  Difficult discharge      Methamphetamine use (HCC)- (present on admission)  Assessment & Plan  - Counselled on cessation.  - PCP establish care

## 2024-02-06 NOTE — TELEPHONE ENCOUNTER
[facility] Call Center    Phone Message    May a detailed message be left on voicemail: yes     Reason for Call: Appointment Intake    Referring Provider Name: Yasmin Bear PA  Diagnosis and/or Symptoms: Vision changes.  Patient states her vision is like  static on a TV.    Action Taken: Message routed to:  [facility] ([facility] Ophthalmology    Travel Screening: Not Applicable

## 2024-02-22 ENCOUNTER — OFFICE VISIT (OUTPATIENT)
Dept: MIDWIFE SERVICES | Facility: CLINIC | Age: 29
End: 2024-02-22
Payer: COMMERCIAL

## 2024-02-22 VITALS — WEIGHT: 123 LBS | BODY MASS INDEX: 20.47 KG/M2

## 2024-02-22 DIAGNOSIS — Z01.812 PRE-PROCEDURE LAB EXAM: Primary | ICD-10-CM

## 2024-02-22 DIAGNOSIS — Z30.09 COUNSELING FOR BIRTH CONTROL REGARDING INTRAUTERINE DEVICE (IUD): ICD-10-CM

## 2024-02-22 LAB — HCG UR QL: NEGATIVE

## 2024-02-22 PROCEDURE — 81025 URINE PREGNANCY TEST: CPT

## 2024-02-22 PROCEDURE — 99213 OFFICE O/P EST LOW 20 MIN: CPT

## 2024-02-22 RX ORDER — MISOPROSTOL 200 UG/1
400 TABLET ORAL ONCE
Qty: 2 TABLET | Refills: 0 | Status: SHIPPED | OUTPATIENT
Start: 2024-02-22 | End: 2024-02-22

## 2024-02-22 RX ORDER — DOXYCYCLINE 100 MG/1
CAPSULE ORAL
COMMUNITY
Start: 2024-02-07

## 2024-02-22 RX ORDER — CLINDAMYCIN PHOSPHATE 10 UG/ML
LOTION TOPICAL
COMMUNITY
Start: 2024-02-07

## 2024-02-22 ASSESSMENT — ANXIETY QUESTIONNAIRES
1. FEELING NERVOUS, ANXIOUS, OR ON EDGE: NEARLY EVERY DAY
GAD7 TOTAL SCORE: 18
3. WORRYING TOO MUCH ABOUT DIFFERENT THINGS: NEARLY EVERY DAY
5. BEING SO RESTLESS THAT IT IS HARD TO SIT STILL: MORE THAN HALF THE DAYS
GAD7 TOTAL SCORE: 18
7. FEELING AFRAID AS IF SOMETHING AWFUL MIGHT HAPPEN: MORE THAN HALF THE DAYS
IF YOU CHECKED OFF ANY PROBLEMS ON THIS QUESTIONNAIRE, HOW DIFFICULT HAVE THESE PROBLEMS MADE IT FOR YOU TO DO YOUR WORK, TAKE CARE OF THINGS AT HOME, OR GET ALONG WITH OTHER PEOPLE: VERY DIFFICULT
2. NOT BEING ABLE TO STOP OR CONTROL WORRYING: NEARLY EVERY DAY
6. BECOMING EASILY ANNOYED OR IRRITABLE: MORE THAN HALF THE DAYS

## 2024-02-22 ASSESSMENT — PATIENT HEALTH QUESTIONNAIRE - PHQ9
5. POOR APPETITE OR OVEREATING: NEARLY EVERY DAY
SUM OF ALL RESPONSES TO PHQ QUESTIONS 1-9: 7

## 2024-02-22 NOTE — PROGRESS NOTES
SUBJECTIVE:   Yue Carbone is a 28 year old who presents to the clinic for discussion of birth control methods.   She has used the following methods in the past: SAHIL  Today she is interested in discussing Mirena IUD  Histories reviewed and updated  Past Medical History:   Diagnosis Date    Known health problems: none 02/10/2023     Past Surgical History:   Procedure Laterality Date    NO HISTORY OF SURGERY  02/10/2023     Social History     Socioeconomic History    Marital status: Single     Spouse name: Not on file    Number of children: Not on file    Years of education: Not on file    Highest education level: Not on file   Occupational History    Not on file   Tobacco Use    Smoking status: Some Days     Packs/day: .25     Types: Cigarettes    Smokeless tobacco: Never   Vaping Use    Vaping Use: Never used   Substance and Sexual Activity    Alcohol use: Yes    Drug use: Never    Sexual activity: Yes     Partners: Male     Birth control/protection: OCP   Other Topics Concern    Parent/sibling w/ CABG, MI or angioplasty before 65F 55M? No   Social History Narrative    Not on file     Social Determinants of Health     Financial Resource Strain: Not on file   Food Insecurity: Not on file   Transportation Needs: Not on file   Physical Activity: Not on file   Stress: Not on file   Social Connections: Not on file   Interpersonal Safety: Not on file   Housing Stability: Not on file     Family History   Problem Relation Age of Onset    Family History Negative Mother     Family History Negative Father     Breast Cancer Maternal Grandmother        Menstrual History:  Menses eregular, monthly  Menstrual description: heavy    Denies the following contraindications to the IUD:  Distortion of the uterine cavity  Miguelangel's disease/copper allergy  Active pelvic infection  Unexplained uterine bleeding  Known or suspected pregnancy  Breast cancer or liver disease    Health maintenance updated:  no    ROS:   12 point review of  systems negative other than symptoms noted below or in the HPI.    EXAM:  Wt 55.8 kg (123 lb)   LMP 02/07/2024 (Approximate)   BMI 20.47 kg/m    Body mass index is 20.47 kg/m .    ASSESSMENT/PLAN:    ICD-10-CM    1. Pre-procedure lab exam  Z01.812 HCG qualitative urine     HCG qualitative urine      2. Counseling for birth control regarding intrauterine device (IUD)  Z30.09 misoprostol (CYTOTEC) 200 MCG tablet        There are no contraindications to the use of Mirena IUD  Will make appointment to return to clinic for placement. Planning to premedicate with ibuprofen, hydroxyzine, and cytotec.    COUNSELING:  Reviewed risks and benefits of contraceptive use  Discussed proper use of chosen method  Handouts/Instrucions provided    20 minutes spent by me on the date of the encounter doing chart review, history and exam, documentation and further activities per the note    Seema Ferrell, MALLORIE, CNM

## 2024-02-29 DIAGNOSIS — Z01.812 PRE-PROCEDURAL LABORATORY EXAMINATION: Primary | ICD-10-CM

## 2024-03-05 NOTE — PROGRESS NOTES
IUD Insertion:  CONSULT:    Is a pregnancy test required: Yes.  Was it positive or negative?  Negative  Was a consent obtained?  Yes    Subjective: Yue Carbone is a 29 year old  presents for IUD and desires Mirena type IUD.    Patient has been given the opportunity to ask questions about all forms of birth control, including all options appropriate for Yue Carbone. Discussed that no method of birth control, except abstinence is 100% effective against pregnancy or sexually transmitted infection.     Yue Carbone understands she may have the IUD removed at any time. IUD should be removed by a health care provider.    The entire insertion procedure was reviewed with the patient, including care after placement. Yue reports being very anxious, she forgot to take ibuprofen, thinks she did the cervical medication correctly. Is nervous about procedure, currently on period.    Reviewed risks including perforation, expulsion, migration and infection.    Patient's last menstrual period was 2024 (exact date). Last sexual activity: currently on menstrual cycle . No allergy to betadine or shellfish. Patient desires STD screening  HCG Qual Urine   Date Value Ref Range Status   2020 Negative NEG^Negative Final     Comment:     This test is for screening purposes.  Results should be interpreted along with   the clinical picture.  Confirmation testing is available if warranted by   ordering [id number], HCG Quantitative Pregnancy.       hCG Urine Qualitative   Date Value Ref Range Status   2024 Negative Negative Final     Comment:     This test is for screening purposes.  Results should be interpreted along with the clinical picture.  Confirmation testing is available if warranted by ordering [id number], HCG Quantitative Pregnancy.         BP 90/62   Wt 56.2 kg (124 lb)   LMP 2024 (Exact Date)   BMI 20.63 kg/m      Pelvic Exam:   EG/BUS: normal genital architecture without lesions,  erythema or abnormal secretions.   Vagina: moist, pink, rugae with physiologic discharge and secretions  Cervix: nulliparous no lesions and pink, moist, closed, without lesion or CMT  Uterus: midposition, mobile, no pain  Adnexa: within normal limits and no masses, nodularity, tenderness    PROCEDURE NOTE: -- IUD Insertion    Reason for Insertion: contraception, abnormal uterine bleeding, and cramping/discomfort with periods    Premedicated with cytotec.  Under sterile technique, cervix was visualized with speculum and prepped with Betadine solution swab x 3. Tenaculum was placed for stability, patient was extremely uncomfortable during placement of instrument and tried to move away, she was asked if she wished to keep going. She wished to proceed, Medical assistant brought in for support.  The uterus was gently straightened and sounded to 7.5 cm. During sounding she again tried to scoot away off the table and asked to stop just as sounding was completed. Informed by provider we could stop but sounding completed, last step would be IUD placement. She agreed to proceed.  IUD then prepared for placement, and IUD inserted according to 's instructions without difficulty or significant resitance, and deployed at the fundus. The strings were visualized and trimmed to 3.0 cm from the external os. Tenaculum was removed and hemostasis noted. Speculum removed.      Patient tolerated procedure with difficulty.    NDC 83706-046-18  Lot # [id number]  Exp: 2026/May    EBL: minimal    Complications: none    ASSESSMENT:     ICD-10-CM    1. Encounter for insertion of intrauterine contraceptive device  Z30.430 levonorgestrel (MIRENA) 52 MG (20 mcg/day) IUD     levonorgestrel (MIRENA) 52 MG (20 mcg/day) IUD 1 each     INSERTION INTRAUTERINE DEVICE      2. Screening for gonorrhea  Z11.3 NEISSERIA GONORRHOEA PCR      3. Screening for chlamydial disease  Z11.8 CHLAMYDIA TRACHOMATIS PCR      4. IUD (intrauterine device) in place   Z97.5            PLAN:  Discussed when to have IUD removed, list of danger s/sx, side effects and follow up recommended.   Encouraged condom use for prevention of STD.   Back up contraception advised for 7 days if progestin method.   Advised to call for any fever, for prolonged or severe pain or bleeding, abnormal vaginal discharge, or unable to palpate strings. She was advised to use pain medications (ibuprofen) as needed for mild to moderate pain and heat for cramping  We reviewed she may have cramping for 24-48 hrs following placement   Advised to follow-up in clinic in 6-12 weeks for IUD string check if unable to find strings or as directed by provider.   Discussed that for future placement/replacement she have  and take anxiety medication prior to placement given the level of difficulty she had with with procedure today    MALLORIE MirandaM

## 2024-03-06 ENCOUNTER — VIRTUAL VISIT (OUTPATIENT)
Dept: PSYCHIATRY | Facility: CLINIC | Age: 29
End: 2024-03-06
Payer: COMMERCIAL

## 2024-03-06 DIAGNOSIS — F41.1 GAD (GENERALIZED ANXIETY DISORDER): Primary | ICD-10-CM

## 2024-03-06 DIAGNOSIS — F33.0 MAJOR DEPRESSIVE DISORDER, RECURRENT EPISODE, MILD (H): ICD-10-CM

## 2024-03-06 PROCEDURE — 99205 OFFICE O/P NEW HI 60 MIN: CPT | Mod: 95 | Performed by: NURSE PRACTITIONER

## 2024-03-06 RX ORDER — HYDROXYZINE HYDROCHLORIDE 25 MG/1
TABLET, FILM COATED ORAL
Qty: 120 TABLET | Refills: 1 | Status: SHIPPED | OUTPATIENT
Start: 2024-03-06 | End: 2024-05-31

## 2024-03-06 RX ORDER — BUSPIRONE HYDROCHLORIDE 7.5 MG/1
7.5 TABLET ORAL 2 TIMES DAILY
Qty: 60 TABLET | Refills: 1 | Status: SHIPPED | OUTPATIENT
Start: 2024-03-06 | End: 2024-03-08

## 2024-03-06 RX ORDER — BUPROPION HYDROCHLORIDE 150 MG/1
150 TABLET ORAL EVERY MORNING
Qty: 30 TABLET | Refills: 1 | Status: SHIPPED | OUTPATIENT
Start: 2024-03-06 | End: 2024-04-12

## 2024-03-06 ASSESSMENT — ANXIETY QUESTIONNAIRES
4. TROUBLE RELAXING: NOT AT ALL
GAD7 TOTAL SCORE: 15
3. WORRYING TOO MUCH ABOUT DIFFERENT THINGS: NEARLY EVERY DAY
5. BEING SO RESTLESS THAT IT IS HARD TO SIT STILL: NOT AT ALL
7. FEELING AFRAID AS IF SOMETHING AWFUL MIGHT HAPPEN: NEARLY EVERY DAY
2. NOT BEING ABLE TO STOP OR CONTROL WORRYING: NEARLY EVERY DAY
GAD7 TOTAL SCORE: 15
IF YOU CHECKED OFF ANY PROBLEMS ON THIS QUESTIONNAIRE, HOW DIFFICULT HAVE THESE PROBLEMS MADE IT FOR YOU TO DO YOUR WORK, TAKE CARE OF THINGS AT HOME, OR GET ALONG WITH OTHER PEOPLE: EXTREMELY DIFFICULT
8. IF YOU CHECKED OFF ANY PROBLEMS, HOW DIFFICULT HAVE THESE MADE IT FOR YOU TO DO YOUR WORK, TAKE CARE OF THINGS AT HOME, OR GET ALONG WITH OTHER PEOPLE?: EXTREMELY DIFFICULT
7. FEELING AFRAID AS IF SOMETHING AWFUL MIGHT HAPPEN: NEARLY EVERY DAY
6. BECOMING EASILY ANNOYED OR IRRITABLE: NEARLY EVERY DAY
GAD7 TOTAL SCORE: 15
1. FEELING NERVOUS, ANXIOUS, OR ON EDGE: NEARLY EVERY DAY

## 2024-03-06 ASSESSMENT — PATIENT HEALTH QUESTIONNAIRE - PHQ9
SUM OF ALL RESPONSES TO PHQ QUESTIONS 1-9: 18
SUM OF ALL RESPONSES TO PHQ QUESTIONS 1-9: 18
10. IF YOU CHECKED OFF ANY PROBLEMS, HOW DIFFICULT HAVE THESE PROBLEMS MADE IT FOR YOU TO DO YOUR WORK, TAKE CARE OF THINGS AT HOME, OR GET ALONG WITH OTHER PEOPLE: VERY DIFFICULT

## 2024-03-06 ASSESSMENT — PAIN SCALES - GENERAL: PAINLEVEL: MODERATE PAIN (5)

## 2024-03-06 NOTE — NURSING NOTE
Is the patient currently in the state of [place]? YES    Visit mode:VIDEO    If the visit is dropped, the patient can be reconnected by: VIDEO VISIT: Text to cell phone:   Telephone Information:   Mobile [phone]       Will anyone else be joining the visit? NO  (If patient encounters technical issues they should call [phone]    How would you like to obtain your AVS? MyChart    Are changes needed to the allergy or medication list? No    Reason for visit: RECHECK    Loraine IQBLA
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
## 2024-03-08 ENCOUNTER — TELEPHONE (OUTPATIENT)
Dept: PSYCHIATRY | Facility: CLINIC | Age: 29
End: 2024-03-08

## 2024-03-08 ENCOUNTER — OFFICE VISIT (OUTPATIENT)
Dept: MIDWIFE SERVICES | Facility: CLINIC | Age: 29
End: 2024-03-08
Payer: COMMERCIAL

## 2024-03-08 ENCOUNTER — LAB (OUTPATIENT)
Dept: LAB | Facility: CLINIC | Age: 29
End: 2024-03-08
Payer: COMMERCIAL

## 2024-03-08 VITALS — SYSTOLIC BLOOD PRESSURE: 90 MMHG | WEIGHT: 124 LBS | BODY MASS INDEX: 20.63 KG/M2 | DIASTOLIC BLOOD PRESSURE: 62 MMHG

## 2024-03-08 DIAGNOSIS — Z01.812 PRE-PROCEDURAL LABORATORY EXAMINATION: ICD-10-CM

## 2024-03-08 DIAGNOSIS — Z97.5 IUD (INTRAUTERINE DEVICE) IN PLACE: ICD-10-CM

## 2024-03-08 DIAGNOSIS — Z30.430 ENCOUNTER FOR INSERTION OF INTRAUTERINE CONTRACEPTIVE DEVICE: Primary | ICD-10-CM

## 2024-03-08 DIAGNOSIS — F41.1 GAD (GENERALIZED ANXIETY DISORDER): Primary | ICD-10-CM

## 2024-03-08 DIAGNOSIS — Z11.8 SCREENING FOR CHLAMYDIAL DISEASE: ICD-10-CM

## 2024-03-08 DIAGNOSIS — Z11.3 SCREENING FOR GONORRHEA: ICD-10-CM

## 2024-03-08 LAB — HCG UR QL: NEGATIVE

## 2024-03-08 PROCEDURE — 81025 URINE PREGNANCY TEST: CPT

## 2024-03-08 PROCEDURE — 87591 N.GONORRHOEAE DNA AMP PROB: CPT | Performed by: ADVANCED PRACTICE MIDWIFE

## 2024-03-08 PROCEDURE — 58300 INSERT INTRAUTERINE DEVICE: CPT | Performed by: ADVANCED PRACTICE MIDWIFE

## 2024-03-08 PROCEDURE — 87491 CHLMYD TRACH DNA AMP PROBE: CPT | Performed by: ADVANCED PRACTICE MIDWIFE

## 2024-03-08 RX ORDER — BUSPIRONE HYDROCHLORIDE 5 MG/1
5 TABLET ORAL 2 TIMES DAILY
Qty: 60 TABLET | Refills: 1 | Status: SHIPPED | OUTPATIENT
Start: 2024-03-08 | End: 2024-04-12

## 2024-03-08 RX ORDER — MISOPROSTOL 200 UG/1
TABLET ORAL
COMMUNITY
Start: 2024-03-08 | End: 2024-04-12

## 2024-03-08 NOTE — TELEPHONE ENCOUNTER
1) PA needed for Buspar 7.5mg twice daily.     2) Phoned [facility] in [place] and spoke with pharmacy staff. Staff reported that Buspar 7.5mg twice daily is not covered. Staff stated that Buspar 5mg twice daily and Buspar 10mg twice daily would be covered.     3) Routing to provider to advise    4) Next RN to route encounter to PA team if provider advises to submit PA          Prior Authorization Retail Medication Request    Medication/Dose: busPIRone (BUSPAR) 7.5 MG tablet  Diagnosis and ICD code (if different than what is on RX):    New/renewal/insurance change PA/secondary ins. PA:  Previously Tried and Failed:    Rationale:      Insurance   Primary:   Insurance ID:      Secondary (if applicable):  Insurance ID:      Pharmacy Information (if different than what is on RX)  Name:    Phone:    Fax:    
1) Phoned patient to review Sadaf Cantu CNP's directives.     2) Patient reports that the pharmacy already filled her prescription for 5mg tablets take 1.5 tablets twice daily, for a dose of 7.5mg twice daily. The cost was $4.     3) The patient would like to continue on this dose if Sadaf Cantu is in agreement.    4) Routing to provider to advise.     5) RN advised patient that nursing would be returning her call after speaking with provider.     TIESHA PARR RN on 3/8/2024 at 11:59 AM     
Let's change buspar to 5mg PO BID to start. New Rx sent. Please inform patient.   MALLORIE Salter CNP on 3/8/2024 at 10:59 AM   
Noted. Will continue buspar 5mg, 1.5 tablet by mouth twice daily.   MALLORIE Salter CNP on 3/8/2024 at 12:16 PM   
Yes

## 2024-03-08 NOTE — RESULT ENCOUNTER NOTE
Results discussed directly with patient while patient was present. Any further details documented in the note.   MALLORIE Miranda CNM

## 2024-03-08 NOTE — PATIENT INSTRUCTIONS
Your Intrauterine Device (IUD)     What to watch for right after IUD placement:    Some women may experience uterine cramps, bleeding, and/or dizziness during and right after IUD placement.     To help minimize the cramps, you may take ibuprofen 600 mg with food. These symptoms should improve over the next 24 hours.  Mild cramping may be present for a few days after IUD placement. You may continue taking ibuprofen as directed if needed.    You may experience spotting or bleeding for the first few weeks to months after IUD placement.      Other side effects can include:  Anemia, hemorrhage, partial or complete expulsion of device, uterine or cervical perforation, embedding of IUD in the uterine wall, increased risk of pelvic inflammatory disease.  Women who become pregnant with an IUD in place are at higher risk of an ectopic pregnancy.  There is also a higher risk of miscarriage when pregnancy occurs with an IUD in place.    Use condoms or abstain from sex for 7 days after the insertion of your Mirena or Kyleena or Courtney  IUD.  This is not necessary if you had a ParaGard IUD placed.    If you experience fever, chills, abdominal pain, worsening pelvic pain, dizziness, unusually heavy vaginal bleeding, suspected expulsion of device or foul smelling vaginal discharge please call the clinic for evaluation.    Please schedule an appointment at the clinic for a string check 4-6 weeks after IUD placement    Your periods may change (Courtney/Kyleena/Mirena):    For the first 3 to 6 months, your monthly period may become irregular. You may also have frequent spotting or light bleeding. A few women have heavy bleeding during this time. After your body adjusts, the number of bleeding days is likely to decrease (but may remain irregular), and you may even find that your periods stop altogether for as long as Courtney/Mirena is in place.  Your periods will return rapidly once the IUD is removed.      ParaGard IUD users:    ParaGard  IUD users may experience heavier than normal cycles while their IUD is in place, this is considered normal   Back-up contraception is not needed      Checking for your strings:    We encourage everyone with an IUD in place to check for their strings monthly    You may check your own IUD strings by inserting a finger into the vagina and feeling the strings as they exit the cervix.  The strings will initially feel firm, like fishing line, but will soften over a few weeks.  After the strings have softened, you or your partner should not be able to feel the strings during intercourse.     If the string length greatly changes or if you cannot feel your strings at all please make an appointment to see you midwife and use a backup method of contraception like a condom.    If you can feel something hard/plastic like the IUD may not be in the correct place. You should then see your healthcare provider to have the position confirmed with ultrasound.       Remember:    IUD's do not protect against HIV or STIs.  IUD's do not prevent the formation of ovarian cysts.  IUD's do not typically reduce acne or cause weight gain or mood changes.    For more information:  [email or web address]-us.com/    The ParaGard IUD is effective for 10 years.  The Courtney IUD is effective for 3 years.  The Kyleena IUD is effective for 5 years.  The Mirena IUD is effective for 8 years.  Your IUD can easily be removed by a healthcare professional at any time.    Do not try to remove the IUD yourself.      If you have questions or concerns please call:    [facility]   [phone]

## 2024-03-09 LAB
C TRACH DNA SPEC QL NAA+PROBE: NEGATIVE
N GONORRHOEA DNA SPEC QL NAA+PROBE: NEGATIVE

## 2024-04-12 ENCOUNTER — VIRTUAL VISIT (OUTPATIENT)
Dept: PSYCHIATRY | Facility: CLINIC | Age: 29
End: 2024-04-12
Payer: COMMERCIAL

## 2024-04-12 DIAGNOSIS — F41.1 GAD (GENERALIZED ANXIETY DISORDER): ICD-10-CM

## 2024-04-12 DIAGNOSIS — F33.0 MAJOR DEPRESSIVE DISORDER, RECURRENT EPISODE, MILD (H): Primary | ICD-10-CM

## 2024-04-12 PROCEDURE — 99214 OFFICE O/P EST MOD 30 MIN: CPT | Mod: 95 | Performed by: NURSE PRACTITIONER

## 2024-04-12 RX ORDER — BUSPIRONE HYDROCHLORIDE 15 MG/1
15 TABLET ORAL 2 TIMES DAILY
Qty: 60 TABLET | Refills: 1 | Status: SHIPPED | OUTPATIENT
Start: 2024-04-12 | End: 2024-05-31

## 2024-04-12 RX ORDER — BUPROPION HYDROCHLORIDE 300 MG/1
300 TABLET ORAL EVERY MORNING
Qty: 30 TABLET | Refills: 1 | Status: SHIPPED | OUTPATIENT
Start: 2024-04-12 | End: 2024-05-31

## 2024-04-12 ASSESSMENT — PAIN SCALES - GENERAL: PAINLEVEL: NO PAIN (0)

## 2024-04-12 NOTE — PROGRESS NOTES
"Virtual Visit Details    Type of service:  Video Visit     Originating Location (pt. Location): {video visit patient location:672178::\"Home\"}  {PROVIDER LOCATION On-site should be selected for visits conducted from your clinic location or adjoining [facility] hospital, academic office, or other nearby [facility] building. Off-site should be selected for all other provider locations, including home:[id number]}  Distant Location (provider location):  {virtual location provider:721501}  Platform used for Video Visit: {Virtual Visit Platforms:759811::\"[employer]\"}    "

## 2024-04-12 NOTE — PROGRESS NOTES
"Virtual Visit Details     Type of service:  Video Visit     Originating Location (pt. Location): Home    Distant Location (provider location):  Off-site  Platform used for Video Visit: [facility]          OUTPATIENT PSYCHIATRIC FOLLOW-UP      2024    Provider: MALLORIE Salter CNP      Appointment Start Time: 2:54 PM  Appointment End Time: 3:15 PM  Name: Yue Carbone   : 1995                    Preferred Name: Yue      Screening Tools           3/6/2024     9:41 AM 2024     3:58 PM 11/10/2023     2:48 PM   PHQ   PHQ-9 Total Score 18 7 14   Q9: Thoughts of better off dead/self-harm past 2 weeks Not at all Not at all Not at all         3/6/2024     9:44 AM 2024     3:58 PM 11/10/2023     2:49 PM   BRADLEY-7 SCORE   Total Score 15 (severe anxiety)     Total Score 15 18 14     PROMIS 10-Global Health (only subscores and total score):       3/6/2024     9:49 AM   PROMIS-10 Scores Only   Global Mental Health Score 7   Global Physical Health Score 11   PROMIS TOTAL - SUBSCORES 18          History of Present Illness      Patient attended the session alone.     Interim History:  I last saw Yue Carbone for outpatient psychiatry Consultation on 3/6/2024. During that appointment, we initiated Wellbutrin and buspirone.     Current stressors include: Symptoms and Relationship Difficulties     Coping mechanisms and supports include: Therapy, Hobbies, and Friends    Side effects: Denies    Medication adherence: Reports good med adherence.Missed x1 day    Psychiatric Review of Systems      Yue Carbone reports mood has been: \"better\"    - Trying to lay low  - Deep cleaning, organizing.     Depression has been:   - Medication is doing something  - Was easier to get up in the morning; harder again since last week  - Feels more productive  - Depression feeling still not going away but more manageable  - Some sense of emptiness  - Less crying.     Anxiety has been:   - this week anxiety higher > " "situational  - overall manageable more.   - less irritable.   - easier to shrug stuff off.     Sleep has been: Going to bed before midnight. Waking around 630/7. Lately more off track. Not worse with Wellbutrin XL    Radha sxs: Denies    Psychosis sxs: Denies    ADHD sxs: More organized, productive > improves self esteem.    PTSD sxs: Denies    SI/SIB: Less suicidal ideation. Passive, fleeting thoughts. Denies plan or intent.       Medications Prior to Appointment       Current Outpatient Medications   Medication Sig Dispense Refill    buPROPion (WELLBUTRIN XL) 150 MG 24 hr tablet Take 1 tablet (150 mg) by mouth every morning 30 tablet 1    busPIRone (BUSPAR) 5 MG tablet Take 1 tablet (5 mg) by mouth 2 times daily 60 tablet 1    clindamycin (CLEOCIN T) 1 % external lotion APPLY TOPICALLY TO THE AFFECTED AREA EVERY MORNING      doxycycline monohydrate (MONODOX) 100 MG capsule TAKE 1 CAPSULE BY MOUTH TWICE DAILY FOR 2 MONTHS WITH FOOD ALREADY IN THE STOMACH      hydrOXYzine HCl (ATARAX) 25 MG tablet 1-2 tablets twice daily as needed for anxiety or sleep 120 tablet 1    metroNIDAZOLE (METROCREAM) 0.75 % external cream APPLY TOPICALLY TO THE AFFECTED AREA EVERY NIGHT AT BEDTIME      spironolactone (ALDACTONE) 100 MG tablet Take 100 mg by mouth At Bedtime      hydrOXYzine (VISTARIL) 100 MG capsule Take 1 capsule by mouth 3 times daily (Patient not taking: Reported on 3/8/2024)      levonorgestrel (MIRENA) 52 MG (20 mcg/day) IUD 1 each by Intrauterine route once      misoprostol (CYTOTEC) 200 MCG tablet  (Patient not taking: Reported on 3/8/2024)            Previous medication trials include but not limited to:  -Abilify  -Citalopram  -Pristiq  -Depakote  -Hydroxyzine  -Lorazepam  -Xanax                 Medication Compliance: \"Miss some\"                 Pharmacogenomic Testing Completed: No         Medical History      History of head injuries: Yes. Concussions. Sports related   History of seizures: No  History of cardiac " events: No  History of Tardive Dyskinesia: No     - Hx of syncope episodes.          Past Medical History:   Diagnosis Date    Known health problems: none 02/10/2023        Surgery:   Past Surgical History:   Procedure Laterality Date    NO HISTORY OF SURGERY  02/10/2023     Primary Care Provider: Naz Catherine MD        Social History      Current Living situation: [facility] with self.    Current use of drugs or alcohol:   - Alcohol social x1. Does miss at times. More social 1 glass of wine.   - THC. Evening.      Tobacco use: Vaping    Employment: Yes.  Full-time dermatology clinic     Relationship Status: in a relationship      Vitals      Not obtained d/t virtual visit.     LMP 03/04/2024 (Exact Date)     Labs        Most recent laboratory results reviewed and pertinent results include:   Lab on 03/08/2024   Component Date Value Ref Range Status    hCG Urine Qualitative 03/08/2024 Negative  Negative Final    This test is for screening purposes.  Results should be interpreted along with the clinical picture.  Confirmation testing is available if warranted by ordering [id number], HCG Quantitative Pregnancy.   Office Visit on 03/08/2024   Component Date Value Ref Range Status    Neisseria gonorrhoeae 03/08/2024 Negative  Negative Final    Negative for N. gonorrhoeae rRNA by transcription mediated amplification. A negative result by transcription mediated amplification does not preclude the presence of C. trachomatis infection because results are dependent on proper and adequate collection, absence of inhibitors and sufficient rRNA to be detected.    Chlamydia trachomatis 03/08/2024 Negative  Negative Final    A negative result by transcription mediated amplification does not preclude the presence of C. trachomatis infection because results are dependent on proper and adequate collection, absence of inhibitors and sufficient rRNA to be detected.   Office Visit on 02/22/2024   Component Date Value Ref  Range Status    hCG Urine Qualitative 02/22/2024 Negative  Negative Final    This test is for screening purposes.  Results should be interpreted along with the clinical picture.  Confirmation testing is available if warranted by ordering [id number], HCG Quantitative Pregnancy.   Office Visit on 11/10/2023   Component Date Value Ref Range Status    Chlamydia trachomatis 11/10/2023 Negative  Negative Final    A negative result by transcription mediated amplification does not preclude the presence of C. trachomatis infection because results are dependent on proper and adequate collection, absence of inhibitors and sufficient rRNA to be detected.    Trichomonas 11/10/2023 Absent  Absent Final    Yeast 11/10/2023 Absent  Absent Final    Clue Cells 11/10/2023 Absent  Absent Final    WBCs/high power field 11/10/2023 2+ (A)  None Final    Interpretation 11/10/2023 Negative for Intraepithelial Lesion or Malignancy (NILM)    Final    Comment 11/10/2023    Final                    Value:This result contains rich text formatting which cannot be displayed here.    Specimen Adequacy 11/10/2023 Satisfactory for evaluation, endocervical/transformation zone component absent   Final    Clinical Information 11/10/2023    Final                    Value:This result contains rich text formatting which cannot be displayed here.    LMP/Menopause Date 11/10/2023    Final                    Value:This result contains rich text formatting which cannot be displayed here.    Reflex Testing 11/10/2023 Yes if ASCUS   Final    Previous Abnormal? 11/10/2023    Final                    Value:This result contains rich text formatting which cannot be displayed here.    Performing Labs 11/10/2023    Final                    Value:This result contains rich text formatting which cannot be displayed here.    Other HR HPV 11/10/2023 Negative  Negative Final    HPV16 DNA 11/10/2023 Negative  Negative Final    HPV18 DNA 11/10/2023 Negative  Negative Final     "FINAL DIAGNOSIS 11/10/2023    Final                    Value:This result contains rich text formatting which cannot be displayed here.     Most recent EKG from 8/9/2023 reviewed. QTc interval 427.        Medical Review of Systems      Pertinent positives noted in HPI and below:   Review of Systems   All other systems reviewed and are negative.       IUD Patient's last menstrual period was 03/04/2024 (exact date).    Pregnant / Breastfeeding: No       Mental Status Exam      Appearance: awake, alert, adequately groomed, appeared stated age and no apparent distress  Attitude:  cooperative   Eye Contact:  good  Gait and Station: normal, no gross abnormalities noted by observation  Psychomotor Behavior:  no evidence of tardive dyskinesia, dystonia, or tics  Oriented to:  person, place, time, and situation  Attention Span and Concentration:  normal  Speech:  clear, coherent, regular rate, rhythm, and volume  Language: intact  Mood: \"Pretty good\"  Affect:  appropriate and in normal range  Associations:  no loose associations  Thought Process:  logical, linear and goal oriented  Thought Content:  no evidence of suicidal ideation or homicidal ideation, no evidence of psychotic thought, no auditory hallucinations present and no visual hallucinations present  Recent and Remote Memory:  Intact to interview. Not formally assessed. No amnesia.  Fund of Knowledge: appropriate  Insight:  partial>full  Judgment:  intact, adequate for safety  Impulse Control:  intact         Risk Assessment       Updated: 4/12/2024    Suicide assessment  Acute: Low  Chronic:Low  Imminent: Low     Risk factors  History of suicide attempts: Yes  History of self-injurious behavior: Yes  Amrit. Axis I psychiatric diagnoses: Yes  Substance use disorder: Yes  Symptoms: anhedonia, impulsivity, insomnia  Family history of completed suicide or attempted suicide: Yes, possible  Accessibility to firearms: No  Interpersonal factors: Relationship challenges, " family dynamic challenges     Protective factors  Ability to cope with the stress: Yes  Family responsibility and supportive:Yes  Positive therapeutic relationships: Yes  Social support:Yes  Yarsanism beliefs:  No  Connectedness with mental health providers: Yes     Homicidal Risk  Acute: Low  Chronic: Low  Imminent: Low    Assessment     Yue Carbone has a past psychiatry history of MDD, BRADLEY, bipolar disorder who has been referred for medication management from primary care provider.  No previous psychiatric inpatient hospitalizations.  1 previous suicide attempt via ingestion around age 20 or 21 years old.  Suicidal ideation that began at a young age that has been passive in nature.  No longer experiencing suicidal ideation since the loss of her friend in 2021.  Knows the impact suicide can have on individuals.  If suicidal ideation were to occur thoughts are passive and fleeting.  History of self harming behaviors of cutting.  Chemical health history significant for experimentation.  Frequent use of cannabis.  Trauma history disclosed including the loss of her friend to fentanyl overdose in 2021.     Yue Carbone reports improvement in depressive symptoms and anxiety.  Tolerating medications overall well.  Was hesitant to take medication, Wellbutrin due to alcohol consumption over [employer] Day.  We did rediscuss that occasional alcohol use would be okay.  Discussed 1-3 drinks which she feels is reasonable when she is out socially and this does not occur that often.  Has not scheduled ADHD testing.  Did not see [employer] message until today when logging in.  Will reach out to get scheduled.  No imminent safety concerns.  Will advance medications to further address anxiety, depression and possible underlying ADHD.        Pharmacologic:   3/6/2024: + Wellbutrin , buspirone 7.5 mg twice daily (insurance would not cover 7.5 mg tablets (     -Wellbutrin  mg by mouth every morning  -BuSpar 15 mg by  mouth twice daily  -Hydroxyzine 25 to 50 mg twice daily as needed for anxiety or sleep        Psychosocial: Would benefit from individual therapy with focus of Cognitive Behavioral Therapy (CBT). This form of therapy will be helpful in addressing cognitive distortions, improving distress tolerance, and developing helpful / healthy coping strategies to address stressors.   -Continue individual therapy           Diagnosis       MDD, recurrent, mild to moderate  BRADLEY        Rule out cannabis use disorder  Rule out ADHD     Plan         1) Medications:   -Wellbutrin  mg by mouth every morning  -BuSpar 15 mg by mouth twice daily  -Hydroxyzine 25 to 50 mg twice daily as needed for anxiety or sleep                    MNPMP: I have queried the [place] and/or [place] Prescription Monitoring Program for this patient for the preceding 12 months, or reviewed the report provided by my proxy delegate. I have not identified any concerns.  2) Risk vs benefits of medications reviewed: Yes  3) Life style modifications: sleep hygiene, exercise, healthy diet  4) Medical concerns:    - No acute concerns  5) Other:   -Continue individual therapy  -Recommend ADHD testing > pt to follow up on referreal   6) Refrain from drinking alcohol and/or use of drugs.   7) Please secure all prescription and OTC medications, sharps, and caustic substances. Please remove all firearms and ammunition.  8) Review outside records, get SLOANE's, coordinate with outside providers  - Obtain SLOANE for [facility] in [place]  9) In case of emergency call 911 or go to the nearest ER, this includes patient voicing thought of harming self or others as well as additional safety concerns   10) Follow-up: 4 to 6 weeks, or sooner if needed.      Administrative Billing:   Supportive therapy was provided, focusing on reflective listening and solution focused problem solving.    Total time preparing to see this patient, face-to-face time, documenting in the EHR, and  coordinating care time on the same calendar date: 30 minutes         Signed:   MALLORIE Salter CNP on 4/15/2024 at 8:47 AM     Disclaimer: This note consists of symbols derived from keyboarding, dictation and/or voice recognition software. As a result, there may be errors in the script that have gone undetected. Please consider this when interpreting information found in this chart.

## 2024-04-12 NOTE — NURSING NOTE
Is the patient currently in the state of [place]? YES    Visit mode:VIDEO    If the visit is dropped, the patient can be reconnected by: VIDEO VISIT: Text to cell phone:   Telephone Information:   Mobile [phone]       Will anyone else be joining the visit? No  (If patient encounters technical issues they should call [phone])    How would you like to obtain your AVS? MyChart    Are changes needed to the allergy or medication list? Yes Please see flagged for removal    Are refills needed on medications prescribed by this physician? NO    Rooming Documentation: Assigned questionnaire(s) completed .    Reason for visit: RECHMELISSA Coto F      Care team has reviewed attendance agreement with patient. Patient advised that two failed appointments within 6 months may lead to termination of current episode of care.

## 2024-05-31 ENCOUNTER — VIRTUAL VISIT (OUTPATIENT)
Dept: PSYCHIATRY | Facility: CLINIC | Age: 29
End: 2024-05-31
Payer: COMMERCIAL

## 2024-05-31 DIAGNOSIS — F33.0 MAJOR DEPRESSIVE DISORDER, RECURRENT EPISODE, MILD (H): ICD-10-CM

## 2024-05-31 DIAGNOSIS — F41.1 GAD (GENERALIZED ANXIETY DISORDER): Primary | ICD-10-CM

## 2024-05-31 PROCEDURE — 99214 OFFICE O/P EST MOD 30 MIN: CPT | Mod: 95 | Performed by: NURSE PRACTITIONER

## 2024-05-31 PROCEDURE — G2211 COMPLEX E/M VISIT ADD ON: HCPCS | Mod: 95 | Performed by: NURSE PRACTITIONER

## 2024-05-31 RX ORDER — BUSPIRONE HYDROCHLORIDE 15 MG/1
15 TABLET ORAL 2 TIMES DAILY
Qty: 60 TABLET | Refills: 1 | Status: SHIPPED | OUTPATIENT
Start: 2024-05-31 | End: 2024-07-30

## 2024-05-31 RX ORDER — HYDROXYZINE HYDROCHLORIDE 25 MG/1
TABLET, FILM COATED ORAL
Qty: 60 TABLET | Refills: 1 | Status: SHIPPED | OUTPATIENT
Start: 2024-05-31 | End: 2024-08-02

## 2024-05-31 RX ORDER — BUPROPION HYDROCHLORIDE 150 MG/1
150 TABLET ORAL EVERY MORNING
Qty: 7 TABLET | Refills: 0 | Status: SHIPPED | OUTPATIENT
Start: 2024-05-31 | End: 2024-09-13

## 2024-05-31 RX ORDER — TRAZODONE HYDROCHLORIDE 50 MG/1
25-100 TABLET, FILM COATED ORAL
Qty: 30 TABLET | Refills: 2 | Status: SHIPPED | OUTPATIENT
Start: 2024-05-31

## 2024-05-31 ASSESSMENT — PATIENT HEALTH QUESTIONNAIRE - PHQ9: SUM OF ALL RESPONSES TO PHQ QUESTIONS 1-9: 16

## 2024-05-31 ASSESSMENT — PAIN SCALES - GENERAL: PAINLEVEL: NO PAIN (0)

## 2024-05-31 NOTE — PROGRESS NOTES
"Virtual Visit Details     Type of service:  Video Visit     Originating Location (pt. Location): Home    Distant Location (provider location):  Off-site  Platform used for Video Visit: [facility]          OUTPATIENT PSYCHIATRIC FOLLOW-UP      2024     Provider: MALLORIE Salter CNP      Appointment Start Time: 250p  Appointment End Time: 309p  Name: Yue Carbone   : 1995                    Preferred Name: Yue      Screening Tools           3/6/2024     9:41 AM 2024     3:58 PM 11/10/2023     2:48 PM   PHQ   PHQ-9 Total Score 18 7 14   Q9: Thoughts of better off dead/self-harm past 2 weeks Not at all Not at all Not at all         3/6/2024     9:44 AM 2024     3:58 PM 11/10/2023     2:49 PM   BRADLEY-7 SCORE   Total Score 15 (severe anxiety)     Total Score 15 18 14     PROMIS 10-Global Health (only subscores and total score):       3/6/2024     9:49 AM   PROMIS-10 Scores Only   Global Mental Health Score 7   Global Physical Health Score 11   PROMIS TOTAL - SUBSCORES 18          History of Present Illness      Patient attended the session alone.     Interim History:  I last saw Yue Carbone for outpatient psychiatry follow-up on 2024. During that appointment, we advanced Wellbutrin and buspirone.     Current stressors include: Symptoms and Relationship Difficulties     Coping mechanisms and supports include: Therapy, Hobbies, and Friends    Side effects: Denies    Medication adherence: Reports good med adherence.    Psychiatric Review of Systems      Yue Carbone reports mood has been: \"Don't love it\"      Depression has been:   - Thought things were going well on Wellbutrin. Liked it at first. \"I wanted to like it\".   - Harder to wake up in the morning.   - Daily tasks > starting and finishing them challenging. Hard time restarting.   - Amotivation is hard.   - Some sadness   - Less pleasure doing things   - More sense of isolating.     Anxiety has been:   - Better  - Anxious " "moments   - More manageable. Feels more moderate.     Sleep has been:   - Sleep schedule regressed  - Hard time falling asleep earlier  - Hard time waking early.   - Causes hectic start to her morning.     Radha sxs: Denies    Psychosis sxs: Denies    ADHD sxs: Procrastination.    PTSD sxs: Denies    SI/SIB: Denies suicidal ideation. \"Nothing at all\". Denies plan or intent. \"Not an option\". Denies SIB urges. Denies HI.       Medications Prior to Appointment       Current Outpatient Medications   Medication Sig Dispense Refill    buPROPion (WELLBUTRIN XL) 300 MG 24 hr tablet Take 1 tablet (300 mg) by mouth every morning 30 tablet 1    busPIRone (BUSPAR) 15 MG tablet Take 1 tablet (15 mg) by mouth 2 times daily 60 tablet 1    clindamycin (CLEOCIN T) 1 % external lotion APPLY TOPICALLY TO THE AFFECTED AREA EVERY MORNING      doxycycline monohydrate (MONODOX) 100 MG capsule TAKE 1 CAPSULE BY MOUTH TWICE DAILY FOR 2 MONTHS WITH FOOD ALREADY IN THE STOMACH      hydrOXYzine HCl (ATARAX) 25 MG tablet 1-2 tablets twice daily as needed for anxiety or sleep 120 tablet 1    metroNIDAZOLE (METROCREAM) 0.75 % external cream APPLY TOPICALLY TO THE AFFECTED AREA EVERY NIGHT AT BEDTIME      spironolactone (ALDACTONE) 100 MG tablet Take 100 mg by mouth At Bedtime            Previous medication trials include but not limited to:  -Abilify  -Citalopram  -Pristiq  -Depakote  -Hydroxyzine  -Lorazepam  -Xanax                 Medication Compliance: \"Miss some\"                 Pharmacogenomic Testing Completed: No         Medical History      History of head injuries: Yes. Concussions. Sports related   History of seizures: No  History of cardiac events: No  History of Tardive Dyskinesia: No     - Hx of syncope episodes.          Past Medical History:   Diagnosis Date    Known health problems: none 02/10/2023        Surgery:   Past Surgical History:   Procedure Laterality Date    NO HISTORY OF SURGERY  02/10/2023     Primary Care Provider: " Naz Catherine MD        Social History      Current Living situation: [facility] with self.    Current use of drugs or alcohol:   - Alcohol social x1. Does miss at times. More social 1 glass of wine.   - THC. Evening.      *Urges to use drugs. No specific drug. Consideration to cocaine as it is around her at times. Would not like to purchase.     Tobacco use: Vaping    Employment: Yes.  Full-time dermatology clinic     Relationship Status: in a relationship      Vitals      Not obtained d/t virtual visit.     There were no vitals taken for this visit.    Labs        Most recent laboratory results reviewed and pertinent results include:   Lab on 03/08/2024   Component Date Value Ref Range Status    hCG Urine Qualitative 03/08/2024 Negative  Negative Final    This test is for screening purposes.  Results should be interpreted along with the clinical picture.  Confirmation testing is available if warranted by ordering [id number], HCG Quantitative Pregnancy.   Office Visit on 03/08/2024   Component Date Value Ref Range Status    Neisseria gonorrhoeae 03/08/2024 Negative  Negative Final    Negative for N. gonorrhoeae rRNA by transcription mediated amplification. A negative result by transcription mediated amplification does not preclude the presence of C. trachomatis infection because results are dependent on proper and adequate collection, absence of inhibitors and sufficient rRNA to be detected.    Chlamydia trachomatis 03/08/2024 Negative  Negative Final    A negative result by transcription mediated amplification does not preclude the presence of C. trachomatis infection because results are dependent on proper and adequate collection, absence of inhibitors and sufficient rRNA to be detected.   Office Visit on 02/22/2024   Component Date Value Ref Range Status    hCG Urine Qualitative 02/22/2024 Negative  Negative Final    This test is for screening purposes.  Results should be interpreted along with the  clinical picture.  Confirmation testing is available if warranted by ordering [id number], HCG Quantitative Pregnancy.   Office Visit on 11/10/2023   Component Date Value Ref Range Status    Chlamydia trachomatis 11/10/2023 Negative  Negative Final    A negative result by transcription mediated amplification does not preclude the presence of C. trachomatis infection because results are dependent on proper and adequate collection, absence of inhibitors and sufficient rRNA to be detected.    Trichomonas 11/10/2023 Absent  Absent Final    Yeast 11/10/2023 Absent  Absent Final    Clue Cells 11/10/2023 Absent  Absent Final    WBCs/high power field 11/10/2023 2+ (A)  None Final    Interpretation 11/10/2023 Negative for Intraepithelial Lesion or Malignancy (NILM)    Final    Comment 11/10/2023    Final                    Value:This result contains rich text formatting which cannot be displayed here.    Specimen Adequacy 11/10/2023 Satisfactory for evaluation, endocervical/transformation zone component absent   Final    Clinical Information 11/10/2023    Final                    Value:This result contains rich text formatting which cannot be displayed here.    LMP/Menopause Date 11/10/2023    Final                    Value:This result contains rich text formatting which cannot be displayed here.    Reflex Testing 11/10/2023 Yes if ASCUS   Final    Previous Abnormal? 11/10/2023    Final                    Value:This result contains rich text formatting which cannot be displayed here.    Performing Labs 11/10/2023    Final                    Value:This result contains rich text formatting which cannot be displayed here.    Other HR HPV 11/10/2023 Negative  Negative Final    HPV16 DNA 11/10/2023 Negative  Negative Final    HPV18 DNA 11/10/2023 Negative  Negative Final    FINAL DIAGNOSIS 11/10/2023    Final                    Value:This result contains rich text formatting which cannot be displayed here.     Most recent EKG from  "8/9/2023 reviewed. QTc interval 427.        Medical Review of Systems      Pertinent positives noted in HPI and below:   Review of Systems   All other systems reviewed and are negative.       IUD No LMP recorded.    Pregnant / Breastfeeding: No       Mental Status Exam      Appearance: awake, alert, adequately groomed, appeared stated age and no apparent distress  Attitude:  cooperative   Eye Contact:  good  Gait and Station: normal, no gross abnormalities noted by observation  Psychomotor Behavior:  no evidence of tardive dyskinesia, dystonia, or tics  Oriented to:  person, place, time, and situation  Attention Span and Concentration:  normal  Speech:  clear, coherent, regular rate, rhythm, and volume  Language: intact  Mood: \"Do not love it\"  Affect:  appropriate and in normal range  Associations:  no loose associations  Thought Process:  logical, linear and goal oriented  Thought Content:  no evidence of suicidal ideation or homicidal ideation, no evidence of psychotic thought, no auditory hallucinations present and no visual hallucinations present  Recent and Remote Memory:  Intact to interview. Not formally assessed. No amnesia.  Fund of Knowledge: appropriate  Insight:  partial>full  Judgment:  intact, adequate for safety  Impulse Control:  intact         Risk Assessment       Updated: 5/31/2024     Suicide assessment  Acute: Low  Chronic:Low  Imminent: Low     Risk factors  History of suicide attempts: Yes  History of self-injurious behavior: Yes  Amrit. Axis I psychiatric diagnoses: Yes  Substance use disorder: Yes  Symptoms: anhedonia, impulsivity, insomnia  Family history of completed suicide or attempted suicide: Yes, possible  Accessibility to firearms: No  Interpersonal factors: Relationship challenges, family dynamic challenges     Protective factors  Ability to cope with the stress: Yes  Family responsibility and supportive:Yes  Positive therapeutic relationships: Yes  Social support:Yes  Judaism " beliefs:  No  Connectedness with mental health providers: Yes     Homicidal Risk  Acute: Low  Chronic: Low  Imminent: Low    Assessment     Yue Carbone has a past psychiatry history of MDD, BRADLEY, bipolar disorder who has been referred for medication management from primary care provider.  No previous psychiatric inpatient hospitalizations.  1 previous suicide attempt via ingestion around age 20 or 21 years old.  Suicidal ideation that began at a young age that has been passive in nature.  No longer experiencing suicidal ideation since the loss of her friend in 2021.  Knows the impact suicide can have on individuals.  If suicidal ideation were to occur thoughts are passive and fleeting.  History of self harming behaviors of cutting.  Chemical health history significant for experimentation.  Frequent use of cannabis.  Trauma history disclosed including the loss of her friend to fentanyl overdose in 2021.     Yue Carbone reports she has high hopes for Wellbutrin being successful.  Has not felt it has been helpful and has regressed with mood and anxiety.  We did discuss tapering off of Wellbutrin XL which patient prefers.  We discussed continuing buspirone at current dosing schedule.  Discussed initiation of fluoxetine.  Discussed risk versus benefit and side effects.  May be a more ideal medication as sometimes she forgets to take on occasion consistently.  No imminent safety concerns identified.  Continue to encourage to look into ADHD testing.      Pharmacologic:   3/6/2024: + Wellbutrin , buspirone 7.5 mg twice daily (insurance would not cover 7.5 mg tablets)  4/12/2024: + Wellbutrin  mg; advance buspirone 15 mg twice daily    -Wellbutrin  mg by mouth every morning x 1 week then stop  -Start fluoxetine 20 mg by mouth every morning  -BuSpar 15 mg by mouth twice daily  -Hydroxyzine 25 to 50 mg twice daily as needed for anxiety or sleep        Psychosocial: Would benefit from individual therapy  with focus of Cognitive Behavioral Therapy (CBT). This form of therapy will be helpful in addressing cognitive distortions, improving distress tolerance, and developing helpful / healthy coping strategies to address stressors.   -Continue individual therapy           Diagnosis       MDD, recurrent, mild to moderate  BRADLEY        Rule out cannabis use disorder  Rule out ADHD     Plan         1) Medications:   -Wellbutrin  mg by mouth every morning x 1 week then stop  -Start fluoxetine 20 mg by mouth every morning  -BuSpar 15 mg by mouth twice daily  -Hydroxyzine 25 to 50 mg twice daily as needed for anxiety or sleep                    [place]MP: I have queried the [place] and/or [place] Prescription Monitoring Program for this patient for the preceding 12 months, or reviewed the report provided by my proxy delegate. I have not identified any concerns.  2) Risk vs benefits of medications reviewed: Yes  3) Life style modifications: sleep hygiene, exercise, healthy diet  4) Medical concerns:    - No acute concerns  5) Other:   -Continue individual therapy  -Recommend ADHD testing > pt to follow up on referreal   6) Refrain from drinking alcohol and/or use of drugs.   7) Please secure all prescription and OTC medications, sharps, and caustic substances. Please remove all firearms and ammunition.  8) Review outside records, get SLOANE's, coordinate with outside providers  - Obtain SLOANE for [facility] in [place]  9) In case of emergency call 911 or go to the nearest ER, this includes patient voicing thought of harming self or others as well as additional safety concerns   10) Follow-up: 4 to 6 weeks, or sooner if needed.      Administrative Billing:   Supportive therapy was provided, focusing on reflective listening and solution focused problem solving.    Total time preparing to see this patient, face-to-face time, documenting in the EHR, and coordinating care time on the same calendar date: 27 minutes    The longitudinal plan of  care for the diagnosis(es)/condition(s) as documented were addressed during this visit. Due to the added complexity in care, I will continue to support Yue in the subsequent management and with ongoing continuity of care.      Signed:   MALLORIE Salter CNP on 6/3/2024 at 9:15 AM     Disclaimer: This note consists of symbols derived from keyboarding, dictation and/or voice recognition software. As a result, there may be errors in the script that have gone undetected. Please consider this when interpreting information found in this chart.

## 2024-05-31 NOTE — NURSING NOTE
Is the patient currently in the state of [place]? YES    Visit mode:VIDEO    If the visit is dropped, the patient can be reconnected by: VIDEO VISIT: Text to cell phone:   Telephone Information:   Mobile [phone]       Will anyone else be joining the visit? No  (If patient encounters technical issues they should call [phone])    How would you like to obtain your AVS? MyChart    Are changes needed to the allergy or medication list? No    Are refills needed on medications prescribed by this physician? NO    Rooming Documentation: Assigned questionnaire(s) completed .    Reason for visit: RECHRASHEED Juarez

## 2024-06-04 DIAGNOSIS — F41.1 GAD (GENERALIZED ANXIETY DISORDER): ICD-10-CM

## 2024-06-04 RX ORDER — BUPROPION HYDROCHLORIDE 150 MG/1
150 TABLET ORAL EVERY MORNING
Qty: 7 TABLET | Refills: 0 | OUTPATIENT
Start: 2024-06-04

## 2024-06-04 NOTE — TELEPHONE ENCOUNTER
Received refill request for Wellbutrin  mg. This was ordered on 5/31/24 for 7 tablets and then plan to stop.     Last visit: -Wellbutrin  mg by mouth every morning x 1 week then stop  -Start fluoxetine 20 mg by mouth every morning  -BuSpar 15 mg by mouth twice daily  -Hydroxyzine 25 to 50 mg twice daily as needed for anxiety or sleep    Refuse refill med discontinued.     Radha Garcia RN on 6/4/2024 at 11:10 AM

## 2024-07-30 DIAGNOSIS — F41.1 GAD (GENERALIZED ANXIETY DISORDER): ICD-10-CM

## 2024-07-30 RX ORDER — BUSPIRONE HYDROCHLORIDE 15 MG/1
15 TABLET ORAL 2 TIMES DAILY
Qty: 60 TABLET | Refills: 0 | Status: SHIPPED | OUTPATIENT
Start: 2024-07-30 | End: 2024-08-02

## 2024-07-30 NOTE — TELEPHONE ENCOUNTER
Date of Last Office Visit: 5/31/24  Date of Next Office Visit:  8/2/24  No shows since last visit: No  More than one patient-initiated cancellation (with reschedule) since last seen in clinic? No    [x]Medication refilled per  Medication Refill in Ambulatory Care  policy.  []Medication unable to be refilled by RN due to criteria not met as indicated below:    []Eligibility: has not had a provider visit within last 6 months   []Supervision: no future appointment; < 7 days before next appointment   []Compliance: no shows; cancellations; lapse in therapy   []Verification: order discrepancy; may need modification...   [] > 30-day supply request   []Advanced refill request: > 7 days before refill date   []Controlled medication   []Medication not included in policy   []Review: new med; med adjusted ? 30 days; safety alert; requires lab monitoring...   []Scope of Practice: refill request processed by LPN/MA   []Other:      Medication(s) requested:     -  busPIRone (BUSPAR) 15 MG tablet  Date last ordered: 5/31/24  Qty: 60  Refills: 1  Appropriate for refill? Yes; refill sent with 0 additional refills to bridge to next appointment.     Any Controlled Substance(s)? No      Requested medication(s) verified as identical to current order? Yes    Any lapse in adherence to medication(s) greater than 5 days? No      Additional action taken? no.      Last visit treatment plan:   1) Medications:   -Wellbutrin  mg by mouth every morning x 1 week then stop  -Start fluoxetine 20 mg by mouth every morning  -BuSpar 15 mg by mouth twice daily  -Hydroxyzine 25 to 50 mg twice daily as needed for anxiety or sleep  Follow-up: 4 to 6 weeks, or sooner if needed.       Any medication(s) require lab monitoring? No

## 2024-08-02 ENCOUNTER — VIRTUAL VISIT (OUTPATIENT)
Dept: PSYCHIATRY | Facility: CLINIC | Age: 29
End: 2024-08-02
Payer: COMMERCIAL

## 2024-08-02 VITALS — WEIGHT: 130 LBS | HEIGHT: 65 IN | BODY MASS INDEX: 21.66 KG/M2

## 2024-08-02 DIAGNOSIS — F33.0 MAJOR DEPRESSIVE DISORDER, RECURRENT EPISODE, MILD (H): ICD-10-CM

## 2024-08-02 DIAGNOSIS — F41.1 GAD (GENERALIZED ANXIETY DISORDER): Primary | ICD-10-CM

## 2024-08-02 PROCEDURE — 99214 OFFICE O/P EST MOD 30 MIN: CPT | Mod: 95 | Performed by: NURSE PRACTITIONER

## 2024-08-02 PROCEDURE — G2211 COMPLEX E/M VISIT ADD ON: HCPCS | Mod: 95 | Performed by: NURSE PRACTITIONER

## 2024-08-02 RX ORDER — HYDROXYZINE HYDROCHLORIDE 25 MG/1
TABLET, FILM COATED ORAL
Qty: 120 TABLET | Refills: 1 | Status: SHIPPED | OUTPATIENT
Start: 2024-08-02 | End: 2024-09-13

## 2024-08-02 RX ORDER — DULOXETIN HYDROCHLORIDE 30 MG/1
30 CAPSULE, DELAYED RELEASE ORAL DAILY
Qty: 30 CAPSULE | Refills: 1 | Status: SHIPPED | OUTPATIENT
Start: 2024-08-02 | End: 2024-09-13

## 2024-08-02 RX ORDER — BUSPIRONE HYDROCHLORIDE 15 MG/1
15 TABLET ORAL 2 TIMES DAILY
Qty: 60 TABLET | Refills: 2 | Status: SHIPPED | OUTPATIENT
Start: 2024-08-02 | End: 2024-09-13

## 2024-08-02 ASSESSMENT — ANXIETY QUESTIONNAIRES
5. BEING SO RESTLESS THAT IT IS HARD TO SIT STILL: MORE THAN HALF THE DAYS
GAD7 TOTAL SCORE: 19
7. FEELING AFRAID AS IF SOMETHING AWFUL MIGHT HAPPEN: MORE THAN HALF THE DAYS
GAD7 TOTAL SCORE: 19
2. NOT BEING ABLE TO STOP OR CONTROL WORRYING: NEARLY EVERY DAY
3. WORRYING TOO MUCH ABOUT DIFFERENT THINGS: NEARLY EVERY DAY
IF YOU CHECKED OFF ANY PROBLEMS ON THIS QUESTIONNAIRE, HOW DIFFICULT HAVE THESE PROBLEMS MADE IT FOR YOU TO DO YOUR WORK, TAKE CARE OF THINGS AT HOME, OR GET ALONG WITH OTHER PEOPLE: SOMEWHAT DIFFICULT
4. TROUBLE RELAXING: NEARLY EVERY DAY
1. FEELING NERVOUS, ANXIOUS, OR ON EDGE: NEARLY EVERY DAY
7. FEELING AFRAID AS IF SOMETHING AWFUL MIGHT HAPPEN: MORE THAN HALF THE DAYS
8. IF YOU CHECKED OFF ANY PROBLEMS, HOW DIFFICULT HAVE THESE MADE IT FOR YOU TO DO YOUR WORK, TAKE CARE OF THINGS AT HOME, OR GET ALONG WITH OTHER PEOPLE?: SOMEWHAT DIFFICULT
GAD7 TOTAL SCORE: 19
6. BECOMING EASILY ANNOYED OR IRRITABLE: NEARLY EVERY DAY

## 2024-08-02 ASSESSMENT — PAIN SCALES - GENERAL: PAINLEVEL: NO PAIN (0)

## 2024-08-02 NOTE — NURSING NOTE
Current patient location:  [address], [place] [address]    Is the patient currently in the state of [place]? YES    Visit mode:VIDEO    If the visit is dropped, the patient can be reconnected by: VIDEO VISIT: Text to cell phone:   Telephone Information:   Mobile [phone]       Will anyone else be joining the visit? NO  (If patient encounters technical issues they should call [phone])    How would you like to obtain your AVS? MyChart    Are changes needed to the allergy or medication list? No    Are refills needed on medications prescribed by this physician? YES    Rooming Documentation:  Assigned questionnaire(s) completed      Reason for visit: RECHMELISSA MCFARLANDF

## 2024-08-02 NOTE — PROGRESS NOTES
"Virtual Visit Details     Type of service:  Video Visit     Originating Location (pt. Location): Home    Distant Location (provider location):  Off-site  Platform used for Video Visit: [facility]          OUTPATIENT PSYCHIATRIC FOLLOW-UP      2024     Provider: MALLORIE Salter CNP      Appointment Start Time: 239  Appointment End Time: 301  Name: Yue Carbone   : 1995                    Preferred Name: Yue      Screening Tools           2024     2:46 PM 3/6/2024     9:41 AM 2024     3:58 PM   PHQ   PHQ-9 Total Score 16 18 7   Q9: Thoughts of better off dead/self-harm past 2 weeks Not at all Not at all Not at all         3/6/2024     9:44 AM 2024     3:58 PM 11/10/2023     2:49 PM   BRADLEY-7 SCORE   Total Score 15 (severe anxiety)     Total Score 15 18 14     PROMIS 10-Global Health (only subscores and total score):       3/6/2024     9:49 AM   PROMIS-10 Scores Only   Global Mental Health Score 7   Global Physical Health Score 11   PROMIS TOTAL - SUBSCORES 18          History of Present Illness      Patient attended the session alone.     Interim History:  I last saw Yue Carbone for outpatient psychiatry follow-up on 24. During that appointment, we stopped wellbutrin xl 150mg and started fluoxetine      Current stressors include: Symptoms and Relationship Difficulties     Coping mechanisms and supports include: Therapy, Hobbies, and Friends    Side effects: Irritable.     Medication adherence: Reports good med adherence.Stretch of 1-2 weeks without hydroxyzine     Psychiatric Review of Systems      Yue Carbone reports mood has been: \"Living in my brain is exhausting\"    - Busy > applying for a new apartment in the middle of the month; cousins wedding upcoming.   - Bought out at work > did get a pay raise    Depression has been:   - Fluoxetine for 3 weeks. Had up tick in irritability. At baseline is irritable.   - Doesn't feel happy.   - Feels she is \"just existing\". " "    Anxiety has been:   - Increased panic sx this month > attributes to being busy   - Had fight with parents over apartment  - Used old Rx for xanax.   - Back on hydroxyzine for the past one week.     Sleep has been:   - Bed around 11p, later then she would like  - Tries to wake around 7a, feels difficult to get up still     Radha sxs: Denies    Psychosis sxs: Denies    ADHD sxs: Procrastination.    PTSD sxs: Denies    SI/SIB: Denies suicidal ideation. Denies plan or intent.Denies SIB urges. Denies HI.       Medications Prior to Appointment       Current Outpatient Medications   Medication Sig Dispense Refill    buPROPion (WELLBUTRIN XL) 150 MG 24 hr tablet Take 1 tablet (150 mg) by mouth every morning 7 tablet 0    busPIRone (BUSPAR) 15 MG tablet TAKE 1 TABLET(15 MG) BY MOUTH TWICE DAILY 60 tablet 0    clindamycin (CLEOCIN T) 1 % external lotion APPLY TOPICALLY TO THE AFFECTED AREA EVERY MORNING      doxycycline monohydrate (MONODOX) 100 MG capsule TAKE 1 CAPSULE BY MOUTH TWICE DAILY FOR 2 MONTHS WITH FOOD ALREADY IN THE STOMACH      FLUoxetine (PROZAC) 20 MG capsule Take 1 capsule (20 mg) by mouth daily 30 capsule 2    hydrOXYzine HCl (ATARAX) 25 MG tablet 1-2 tablets twice daily as needed for anxiety 60 tablet 1    metroNIDAZOLE (METROCREAM) 0.75 % external cream APPLY TOPICALLY TO THE AFFECTED AREA EVERY NIGHT AT BEDTIME      spironolactone (ALDACTONE) 100 MG tablet Take 100 mg by mouth At Bedtime      traZODone (DESYREL) 50 MG tablet Take 0.5-2 tablets ( mg) by mouth nightly as needed for sleep 30 tablet 2          Previous medication trials include but not limited to:  -Abilify  -Citalopram  -Pristiq  -Depakote  -Hydroxyzine  -Lorazepam  -Xanax                 Medication Compliance: \"Miss some\"                 Pharmacogenomic Testing Completed: No         Medical History      History of head injuries: Yes. Concussions. Sports related   History of seizures: No  History of cardiac events: No  History of " Tardive Dyskinesia: No     - Hx of syncope episodes.          Past Medical History:   Diagnosis Date    Known health problems: none 02/10/2023        Surgery:   Past Surgical History:   Procedure Laterality Date    NO HISTORY OF SURGERY  02/10/2023     Primary Care Provider: Naz Catherine MD        Social History      Current Living situation: [facility] with self.    Current use of drugs or alcohol:   - Alcohol social x1. Does miss at times. More social 1 glass of wine.   - THC. Evening.      *Not having to urges to use. Hasn't been on her mind. Trying to quit vape with co worker.     Tobacco use: Vaping    Employment: Yes.  Full-time dermatology clinic     Relationship Status: in a relationship      Vitals      Not obtained d/t virtual visit.     There were no vitals taken for this visit.    Labs        Most recent laboratory results reviewed and pertinent results include:   Lab on 03/08/2024   Component Date Value Ref Range Status    hCG Urine Qualitative 03/08/2024 Negative  Negative Final    This test is for screening purposes.  Results should be interpreted along with the clinical picture.  Confirmation testing is available if warranted by ordering [id number], HCG Quantitative Pregnancy.   Office Visit on 03/08/2024   Component Date Value Ref Range Status    Neisseria gonorrhoeae 03/08/2024 Negative  Negative Final    Negative for N. gonorrhoeae rRNA by transcription mediated amplification. A negative result by transcription mediated amplification does not preclude the presence of C. trachomatis infection because results are dependent on proper and adequate collection, absence of inhibitors and sufficient rRNA to be detected.    Chlamydia trachomatis 03/08/2024 Negative  Negative Final    A negative result by transcription mediated amplification does not preclude the presence of C. trachomatis infection because results are dependent on proper and adequate collection, absence of inhibitors and  sufficient rRNA to be detected.   Office Visit on 02/22/2024   Component Date Value Ref Range Status    hCG Urine Qualitative 02/22/2024 Negative  Negative Final    This test is for screening purposes.  Results should be interpreted along with the clinical picture.  Confirmation testing is available if warranted by ordering [id number], HCG Quantitative Pregnancy.   Office Visit on 11/10/2023   Component Date Value Ref Range Status    Chlamydia trachomatis 11/10/2023 Negative  Negative Final    A negative result by transcription mediated amplification does not preclude the presence of C. trachomatis infection because results are dependent on proper and adequate collection, absence of inhibitors and sufficient rRNA to be detected.    Trichomonas 11/10/2023 Absent  Absent Final    Yeast 11/10/2023 Absent  Absent Final    Clue Cells 11/10/2023 Absent  Absent Final    WBCs/high power field 11/10/2023 2+ (A)  None Final    Interpretation 11/10/2023 Negative for Intraepithelial Lesion or Malignancy (NILM)    Final    Comment 11/10/2023    Final                    Value:This result contains rich text formatting which cannot be displayed here.    Specimen Adequacy 11/10/2023 Satisfactory for evaluation, endocervical/transformation zone component absent   Final    Clinical Information 11/10/2023    Final                    Value:This result contains rich text formatting which cannot be displayed here.    LMP/Menopause Date 11/10/2023    Final                    Value:This result contains rich text formatting which cannot be displayed here.    Reflex Testing 11/10/2023 Yes if ASCUS   Final    Previous Abnormal? 11/10/2023    Final                    Value:This result contains rich text formatting which cannot be displayed here.    Performing Labs 11/10/2023    Final                    Value:This result contains rich text formatting which cannot be displayed here.    Other HR HPV 11/10/2023 Negative  Negative Final    HPV16 DNA  "11/10/2023 Negative  Negative Final    HPV18 DNA 11/10/2023 Negative  Negative Final    FINAL DIAGNOSIS 11/10/2023    Final                    Value:This result contains rich text formatting which cannot be displayed here.     Most recent EKG from 8/9/2023 reviewed. QTc interval 427.        Medical Review of Systems      Pertinent positives noted in HPI and below:   Review of Systems   All other systems reviewed and are negative.       IUD No LMP recorded.    Pregnant / Breastfeeding: No       Mental Status Exam      Appearance: awake, alert, adequately groomed, appeared stated age and no apparent distress  Attitude:  cooperative   Eye Contact:  good  Gait and Station: normal, no gross abnormalities noted by observation  Psychomotor Behavior:  no evidence of tardive dyskinesia, dystonia, or tics  Oriented to:  person, place, time, and situation  Attention Span and Concentration:  normal  Speech:  clear, coherent, regular rate, rhythm, and volume  Language: intact  Mood: \"not great\"  Affect:  appropriate and in normal range  Associations:  no loose associations  Thought Process:  logical, linear and goal oriented  Thought Content:  no evidence of suicidal ideation or homicidal ideation, no evidence of psychotic thought, no auditory hallucinations present and no visual hallucinations present  Recent and Remote Memory:  Intact to interview. Not formally assessed. No amnesia.  Fund of Knowledge: appropriate  Insight:  partial>full  Judgment:  intact, adequate for safety  Impulse Control:  intact         Risk Assessment       Updated: 8/2/2024      Suicide assessment  Acute: Low  Chronic:Low  Imminent: Low     Risk factors  History of suicide attempts: Yes  History of self-injurious behavior: Yes  Amrit. Axis I psychiatric diagnoses: Yes  Substance use disorder: Yes  Symptoms: anhedonia, impulsivity, insomnia  Family history of completed suicide or attempted suicide: Yes, possible  Accessibility to firearms: " No  Interpersonal factors: Relationship challenges, family dynamic challenges     Protective factors  Ability to cope with the stress: Yes  Family responsibility and supportive:Yes  Positive therapeutic relationships: Yes  Social support:Yes  Sabianist beliefs:  No  Connectedness with mental health providers: Yes     Homicidal Risk  Acute: Low  Chronic: Low  Imminent: Low    Assessment     Yue Carbone has a past psychiatry history of MDD, BRADLEY, bipolar disorder who has been referred for medication management from primary care provider.  No previous psychiatric inpatient hospitalizations.  1 previous suicide attempt via ingestion around age 20 or 21 years old.  Suicidal ideation that began at a young age that has been passive in nature.  No longer experiencing suicidal ideation since the loss of her friend in 2021.  Knows the impact suicide can have on individuals.  If suicidal ideation were to occur thoughts are passive and fleeting.  History of self harming behaviors of cutting.  Chemical health history significant for experimentation.  Frequent use of cannabis.  Trauma history disclosed including the loss of her friend to fentanyl overdose in 2021.     Yue Carbone reports she didn't tolerate fluoxetine. Stopped after 3 weeks. Has found buspar to be helpful. We discussed optimizing further. However given depressive sx we discussed trial of duloxetine. Discussed risk vs benefit and side effects. She continues to look into ADHD testing. Has not been able to find insurance covering ADHD testing. No imminent safety concerns.       Pharmacologic:   3/6/2024: + Wellbutrin , buspirone 7.5 mg twice daily (insurance would not cover 7.5 mg tablets)  4/12/2024: + Wellbutrin  mg; advance buspirone 15 mg twice daily  05/31/24: stop wellbutrin XL 150mg (taper); + fluoxetine       - Stop fluoxetine 20 mg by mouth every morning  - Continue BuSpar 15 mg by mouth twice daily  - Start duloxetine 30mg by mouth every  day  - Hydroxyzine 25 to 50 mg twice daily as needed for anxiety or sleep  -Trazodone 50mg by mouth at bedtime as needed      Psychosocial: Would benefit from individual therapy with focus of Cognitive Behavioral Therapy (CBT). This form of therapy will be helpful in addressing cognitive distortions, improving distress tolerance, and developing helpful / healthy coping strategies to address stressors.   -Continue individual therapy           Diagnosis       MDD, recurrent, mild to moderate  BRADLEY        Rule out cannabis use disorder  Rule out ADHD     Plan         1) Medications:   - Stop fluoxetine 20 mg by mouth every morning  - Continue BuSpar 15 mg by mouth twice daily  - Start duloxetine 30mg by mouth every day  - Hydroxyzine 25 to 50 mg twice daily as needed for anxiety or sleep  -Trazodone 50mg by mouth at bedtime as needed                  MNPMP: I have queried the [place] and/or [place] Prescription Monitoring Program for this patient for the preceding 12 months, or reviewed the report provided by my proxy delegate. I have not identified any concerns.  2) Risk vs benefits of medications reviewed: Yes  3) Life style modifications: sleep hygiene, exercise, healthy diet  4) Medical concerns:    - No acute concerns  5) Other:   -Continue individual therapy  -Recommend ADHD testing > pt to follow up on referral   6) Refrain from drinking alcohol and/or use of drugs.   7) Please secure all prescription and OTC medications, sharps, and caustic substances. Please remove all firearms and ammunition.  8) Review outside records, get SLOANE's, coordinate with outside providers  - Obtain SLOANE for [facility] in [place]  9) In case of emergency call 911 or go to the nearest ER, this includes patient voicing thought of harming self or others as well as additional safety concerns   10) Follow-up: 4 to 6 weeks, or sooner if needed.      Administrative Billing:   Supportive therapy was provided, focusing on reflective listening and  solution focused problem solving.    Total time preparing to see this patient, face-to-face time, documenting in the EHR, and coordinating care time on the same calendar date: 27 minutes    The longitudinal plan of care for the diagnosis(es)/condition(s) as documented were addressed during this visit. Due to the added complexity in care, I will continue to support Yue in the subsequent management and with ongoing continuity of care.      Signed:   MALLORIE Salter CNP on 8/2/2024 at 3:07 PM     Disclaimer: This note consists of symbols derived from keyboarding, dictation and/or voice recognition software. As a result, there may be errors in the script that have gone undetected. Please consider this when interpreting information found in this chart.

## 2024-08-02 NOTE — PROGRESS NOTES
"Virtual Visit Details    Type of service:  Video Visit     Originating Location (pt. Location): {video visit patient location:372094::\"Home\"}  {PROVIDER LOCATION On-site should be selected for visits conducted from your clinic location or adjoining [facility] hospital, academic office, or other nearby [facility] building. Off-site should be selected for all other provider locations, including home:110345}  Distant Location (provider location):  {virtual location provider:317133}  Platform used for Video Visit: {Virtual Visit Platforms:269033::\"CFEngine\"}    "

## 2024-09-13 ENCOUNTER — VIRTUAL VISIT (OUTPATIENT)
Dept: PSYCHIATRY | Facility: CLINIC | Age: 29
End: 2024-09-13
Payer: COMMERCIAL

## 2024-09-13 DIAGNOSIS — F33.0 MAJOR DEPRESSIVE DISORDER, RECURRENT EPISODE, MILD (H): ICD-10-CM

## 2024-09-13 DIAGNOSIS — F60.3 BORDERLINE PERSONALITY DISORDER (H): Primary | ICD-10-CM

## 2024-09-13 DIAGNOSIS — F41.1 GAD (GENERALIZED ANXIETY DISORDER): ICD-10-CM

## 2024-09-13 PROCEDURE — 99214 OFFICE O/P EST MOD 30 MIN: CPT | Mod: 95 | Performed by: NURSE PRACTITIONER

## 2024-09-13 PROCEDURE — G2211 COMPLEX E/M VISIT ADD ON: HCPCS | Mod: 95 | Performed by: NURSE PRACTITIONER

## 2024-09-13 RX ORDER — LAMOTRIGINE 25 MG/1
TABLET ORAL
Qty: 60 TABLET | Refills: 1 | Status: SHIPPED | OUTPATIENT
Start: 2024-09-13

## 2024-09-13 RX ORDER — BUSPIRONE HYDROCHLORIDE 15 MG/1
15 TABLET ORAL 2 TIMES DAILY
Qty: 60 TABLET | Refills: 2 | Status: SHIPPED | OUTPATIENT
Start: 2024-09-13 | End: 2024-09-26

## 2024-09-13 RX ORDER — HYDROXYZINE HYDROCHLORIDE 25 MG/1
TABLET, FILM COATED ORAL
Qty: 120 TABLET | Refills: 1 | Status: SHIPPED | OUTPATIENT
Start: 2024-09-13

## 2024-09-13 ASSESSMENT — PATIENT HEALTH QUESTIONNAIRE - PHQ9
SUM OF ALL RESPONSES TO PHQ QUESTIONS 1-9: 18
10. IF YOU CHECKED OFF ANY PROBLEMS, HOW DIFFICULT HAVE THESE PROBLEMS MADE IT FOR YOU TO DO YOUR WORK, TAKE CARE OF THINGS AT HOME, OR GET ALONG WITH OTHER PEOPLE: EXTREMELY DIFFICULT
SUM OF ALL RESPONSES TO PHQ QUESTIONS 1-9: 18

## 2024-09-13 ASSESSMENT — PAIN SCALES - GENERAL: PAINLEVEL: MILD PAIN (2)

## 2024-09-13 NOTE — Clinical Note
Please have patient complete release of information for [facility] in [place] for therapy coordination of care

## 2024-09-13 NOTE — NURSING NOTE
Current patient location:  work parking lot    Is the patient currently in the state of [place]? YES    Visit mode:VIDEO    If the visit is dropped, the patient can be reconnected by: VIDEO VISIT: Text to cell phone:   Telephone Information:   Mobile [phone]       Will anyone else be joining the visit? NO  (If patient encounters technical issues they should call [phone])    How would you like to obtain your AVS? MyChart    Are changes needed to the allergy or medication list? Pt stated no changes to allergies and Pt stated no med changes    Are refills needed on medications prescribed by this physician? NO    Rooming Documentation:  Questionnaire(s) completed      Reason for visit: SHERRY IQBAL

## 2024-09-13 NOTE — PATIENT INSTRUCTIONS
Treatment plan today   Follow up in 4 weeks (or sooner as needed)  Medication changes   - Start lamotrigine  Step 1: Lamotrigine 25 mg tablet, take 1 tab daily for 14 days  Step 2: Lamotrigine 25mg tablet, take 2 tablets (50 mg) daily thereafter     *If you miss more then 4 days of this medication, you will need to re start at Step1 and follow the same instructions.   *We reviewed risk/benefit/warning signs of Pacheco Johnsons Syndrome     - BuSpar 15 mg by mouth twice daily  - Hydroxyzine 25 to 50 mg twice daily as needed for anxiety or sleep  -Trazodone 50mg by mouth at bedtime as needed     Release of information to be sent to to complete for me to talk to your therapist  Please look at this website:  [email or web address]      Communication  Call the psychiatric nurse line with medication questions or concerns at [phone] or [phone].  SinglePlatform may be used to communicate with your care team, but this is not intended to be used for emergencies.  You can call the above number to make appointments, leave a message with our nursing team, and inquire about any mental health referrals I have placed.  Please call your pharmacy to request a refill of your medications listed above if needed between appointments.   Safety Plan - see below for crisis resources   Call or text [phone] for mental health crisis.   Call 911 or use ER for potentially life-threatening situations.     MALLORIE Maria, PMMEGHANP  Panel Psychiatry Service   [facility]         RESOURCES     Crisis Resources  For emergency help, please call 911 or go to the nearest Emergency Department.     Emergency Walk-In Options:   EmPATH Unit @ [facility] ([place]): [phone] - Specialized mental health emergency area designed to be calming  [facility] ([place]): [phone]  [facility] ([place]): [phone]  [facility]):  [phone]    [facility] Crisis Information:   [place]: [phone]  [place]: [phone]  [place] (COPE) - Adult: [phone]     Child: [phone]  [place] - Adult: [phone]     Child: [phone]  [place]: [phone]  [facility], [place], [facility], [facility], and the [facility] [place]: [phone] or TEXT  [place]  to [phone]  List of all [facility] resources:   https://[place].gov/dhs/people-we-serve/adults/health-care/mental-health/resources/crisis-contacts.jsp    National Crisis Information:   National Suicide & Crisis Lifeline: Call 988   For online chat options, visit https://suicidepreventionlifeline.org/chat/  Poison Control Center: 0-558-350-7268  Poison Control Center: 2-908-391-4136  Trans Lifeline: 6-565-338-7311 - Hotline for transgender people of all ages  [employer]: 4-122-901-9566 - Hotline for LGBT youth     For Non-Emergency Support:   Fast Tracker: Mental Health & Substance Use Disorder Resources -   https://www.fasttrackermn.org/    Additional Resources  Financial Assistance [phone]  [facility] Billing [phone]  Central Billing Office, ealth: [phone]  [facility] Billing [phone]  Medical Records [phone]  [facility] Patient Bill of Rights [email or web address]         Patient Education   The [facility] Psychiatry Program  What to Expect  Here's what to expect in the Panel Psychiatry Program.   About the program  You'll be meeting with a psychiatric doctor to check your mental health. A psychiatric doctor helps you deal with troubling thoughts and feelings by giving you medicine. They'll make sure you know the plan for your care. You may see them for a long time. When you're feeling better, they may refer you back to seeing your family doctor.   If you have any questions, we'll be glad to talk to you.  About visits  Be open  At your visits, please talk openly about your problems. It may feel  "hard, but it's the best way for us to help you.  Cancelling visits  If you can't come to your visit, please call us right away at [phone]. If you don't cancel at least 24 hours (1 full day) before your visit, that's \"late cancellation.\"  Not showing up for your visits  Being very late is the same as not showing up. You'll be a \"no show\" if:  You're more than 15 minutes late for a 30-minute (half hour) visit.  You're more than 30 minutes late for a 60-minute (full hour) visit.  If you cancel late or don't show up 2 times within 6 months, we may end your care.  Getting help between visits  If you need help between visits, you can call us Monday to Friday from 8 a.m. to 4:30 p.m. at [phone].  Emergency care  Call 911 or go to the nearest emergency department if your life or someone else's life is in danger.  Call 988 anytime to reach the [facility] Suicide and Crisis hotline.  Medicine refills  To refill your medicine, call your pharmacy. You can also call [facility]'s [facility] at [phone], Monday to Friday, 8 a.m. to 4:30 p.m. It can take 1 to 3 business days to get a refill.   Forms, letters, and tests  You may have papers to fill out, like FMLA, short-term disability, and workability. We can help you with these forms at your visits, but you must have an appointment. You may need more than 1 visit for this, to be in an intensive therapy program, or both.  Before we can give you medicine for ADHD, we may refer you to get tested for it or confirm it another way.  We may not be able to give you an emotional support animal letter.  We don't do mental health checks ordered by the court.   We don't do mental health testing, but we can refer you to get tested.   Thank you for choosing us for your care.  For informational purposes only. Not to replace the advice of your health care provider. Copyright   2022 [facility]. All rights reserved. [employer] [id number] - 12/22.       "

## 2024-09-13 NOTE — PROGRESS NOTES
"Virtual Visit Details     Type of service:  Video Visit     Originating Location (pt. Location): Home    Distant Location (provider location):  Off-site  Platform used for Video Visit: [facility]          OUTPATIENT PSYCHIATRIC FOLLOW-UP      2024     Provider: MALLORIE Salter CNP      Appointment Start Time:   Appointment End Time:   Name: Yue Carbone   : 1995                    Preferred Name: Yue      Screening Tools           2024     1:32 PM 2024     2:46 PM 3/6/2024     9:41 AM   PHQ   PHQ-9 Total Score 18 16 18   Q9: Thoughts of better off dead/self-harm past 2 weeks Not at all Not at all Not at all         2024     2:35 PM 3/6/2024     9:44 AM 2024     3:58 PM   BRADLEY-7 SCORE   Total Score 19 (severe anxiety) 15 (severe anxiety)    Total Score 19 15 18     PROMIS 10-Global Health (only subscores and total score):       3/6/2024     9:49 AM 2024     2:37 PM   PROMIS-10 Scores Only   Global Mental Health Score 7 5   Global Physical Health Score 11 11   PROMIS TOTAL - SUBSCORES 18 16          History of Present Illness      Patient attended the session alone.     Interim History:  I last saw Yue Carbone for outpatient psychiatry follow-up on 24. During that appointment, we stopped fluoxetine, started duloxetine     Current stressors include: Symptoms and Relationship Difficulties     Coping mechanisms and supports include: Therapy, Hobbies, and Friends    Side effects: Anxiety     Medication adherence: Reports good med adherence.Stopped     Psychiatric Review of Systems      Yue Riveratanvir Hammon reports mood has been: \"Okay\"    - Moving next week.   - Duloxetine caused more anxiety. Tried for about 2-3 weeks. Stopped 1-2 weeks prior to event and anxiety improved.     Depression has been:   - Overall fine.   - \"Slopes here and there\"  - Decreased anxiety helps anxiety.   - Low energy.     Anxiety has been:   - Panic attacks e/o day with duloxetine.   - " "Decreased with duloxetine.   - Noticing any set back can cause ruminations   - Inconveniences can spiral her  - Romantic relationships can carry her mood  - Can be explosive in relationships  - Feels she can be extremely immature in situations.     Sleep has been:   - Stable.     Radha sxs: Denies    Psychosis sxs: Denies    ADHD sxs: Procrastination. Disorganized.     PTSD sxs: Denies    SI/SIB: Denies suicidal ideation. Denies plan or intent.Denies SIB urges. Denies HI.       Medications Prior to Appointment       Current Outpatient Medications   Medication Sig Dispense Refill    buPROPion (WELLBUTRIN XL) 150 MG 24 hr tablet Take 1 tablet (150 mg) by mouth every morning 7 tablet 0    busPIRone (BUSPAR) 15 MG tablet Take 1 tablet (15 mg) by mouth 2 times daily 60 tablet 2    clindamycin (CLEOCIN T) 1 % external lotion APPLY TOPICALLY TO THE AFFECTED AREA EVERY MORNING      doxycycline monohydrate (MONODOX) 100 MG capsule TAKE 1 CAPSULE BY MOUTH TWICE DAILY FOR 2 MONTHS WITH FOOD ALREADY IN THE STOMACH      DULoxetine (CYMBALTA) 30 MG capsule Take 1 capsule (30 mg) by mouth daily 30 capsule 1    hydrOXYzine HCl (ATARAX) 25 MG tablet 1-2 tablets twice daily as needed for anxiety 120 tablet 1    metroNIDAZOLE (METROCREAM) 0.75 % external cream APPLY TOPICALLY TO THE AFFECTED AREA EVERY NIGHT AT BEDTIME      spironolactone (ALDACTONE) 100 MG tablet Take 100 mg by mouth At Bedtime      traZODone (DESYREL) 50 MG tablet Take 0.5-2 tablets ( mg) by mouth nightly as needed for sleep 30 tablet 2          Previous medication trials include but not limited to:  -Abilify  -Citalopram  -Pristiq  -Depakote  -Hydroxyzine  -Lorazepam  -Xanax                 Medication Compliance: \"Miss some\"                 Pharmacogenomic Testing Completed: No         Medical History      History of head injuries: Yes. Concussions. Sports related   History of seizures: No  History of cardiac events: No  History of Tardive Dyskinesia: No   "   - Hx of syncope episodes.          Past Medical History:   Diagnosis Date    Known health problems: none 02/10/2023        Surgery:   Past Surgical History:   Procedure Laterality Date    NO HISTORY OF SURGERY  02/10/2023     Primary Care Provider: Naz Catherine MD        Social History      Current Living situation: [facility] with self.    Current use of drugs or alcohol:   - Alcohol social x1. Does miss at times. More social 1 glass of wine.   - THC. Evening.  Less.         Tobacco use: Vaping    Employment: Yes.  Full-time dermatology clinic     Relationship Status: in a relationship      Vitals      Not obtained d/t virtual visit.     There were no vitals taken for this visit.    Labs        Most recent laboratory results reviewed and pertinent results include:   Lab on 03/08/2024   Component Date Value Ref Range Status    hCG Urine Qualitative 03/08/2024 Negative  Negative Final    This test is for screening purposes.  Results should be interpreted along with the clinical picture.  Confirmation testing is available if warranted by ordering [id number], HCG Quantitative Pregnancy.   Office Visit on 03/08/2024   Component Date Value Ref Range Status    Neisseria gonorrhoeae 03/08/2024 Negative  Negative Final    Negative for N. gonorrhoeae rRNA by transcription mediated amplification. A negative result by transcription mediated amplification does not preclude the presence of C. trachomatis infection because results are dependent on proper and adequate collection, absence of inhibitors and sufficient rRNA to be detected.    Chlamydia trachomatis 03/08/2024 Negative  Negative Final    A negative result by transcription mediated amplification does not preclude the presence of C. trachomatis infection because results are dependent on proper and adequate collection, absence of inhibitors and sufficient rRNA to be detected.   Office Visit on 02/22/2024   Component Date Value Ref Range Status    hCG Urine  Qualitative 02/22/2024 Negative  Negative Final    This test is for screening purposes.  Results should be interpreted along with the clinical picture.  Confirmation testing is available if warranted by ordering [id number], HCG Quantitative Pregnancy.   Office Visit on 11/10/2023   Component Date Value Ref Range Status    Chlamydia trachomatis 11/10/2023 Negative  Negative Final    A negative result by transcription mediated amplification does not preclude the presence of C. trachomatis infection because results are dependent on proper and adequate collection, absence of inhibitors and sufficient rRNA to be detected.    Trichomonas 11/10/2023 Absent  Absent Final    Yeast 11/10/2023 Absent  Absent Final    Clue Cells 11/10/2023 Absent  Absent Final    WBCs/high power field 11/10/2023 2+ (A)  None Final    Interpretation 11/10/2023 Negative for Intraepithelial Lesion or Malignancy (NILM)    Final    Comment 11/10/2023    Final                    Value:This result contains rich text formatting which cannot be displayed here.    Specimen Adequacy 11/10/2023 Satisfactory for evaluation, endocervical/transformation zone component absent   Final    Clinical Information 11/10/2023    Final                    Value:This result contains rich text formatting which cannot be displayed here.    LMP/Menopause Date 11/10/2023    Final                    Value:This result contains rich text formatting which cannot be displayed here.    Reflex Testing 11/10/2023 Yes if ASCUS   Final    Previous Abnormal? 11/10/2023    Final                    Value:This result contains rich text formatting which cannot be displayed here.    Performing Labs 11/10/2023    Final                    Value:This result contains rich text formatting which cannot be displayed here.    Other HR HPV 11/10/2023 Negative  Negative Final    HPV16 DNA 11/10/2023 Negative  Negative Final    HPV18 DNA 11/10/2023 Negative  Negative Final    FINAL DIAGNOSIS 11/10/2023     "Final                    Value:This result contains rich text formatting which cannot be displayed here.     Most recent EKG from 8/9/2023 reviewed. QTc interval 427.        Medical Review of Systems      Pertinent positives noted in HPI and below:   Review of Systems   All other systems reviewed and are negative.       IUD No LMP recorded.    Pregnant / Breastfeeding: No       Mental Status Exam      Appearance: awake, alert, adequately groomed, appeared stated age and no apparent distress  Attitude:  cooperative   Eye Contact:  good  Gait and Station: normal, no gross abnormalities noted by observation  Psychomotor Behavior:  no evidence of tardive dyskinesia, dystonia, or tics  Oriented to:  person, place, time, and situation  Attention Span and Concentration:  normal  Speech:  clear, coherent, regular rate, rhythm, and volume  Language: intact  Mood: \"fine\"  Affect:  appropriate and in normal range  Associations:  no loose associations  Thought Process:  logical, linear and goal oriented  Thought Content:  no evidence of suicidal ideation or homicidal ideation, no evidence of psychotic thought, no auditory hallucinations present and no visual hallucinations present  Recent and Remote Memory:  Intact to interview. Not formally assessed. No amnesia.  Fund of Knowledge: appropriate  Insight:  partial>full  Judgment:  intact, adequate for safety  Impulse Control:  intact         Risk Assessment         Suicide assessment  Acute: Low  Chronic:Low  Imminent: Low     Risk factors  History of suicide attempts: Yes  History of self-injurious behavior: Yes  Amrit. Axis I psychiatric diagnoses: Yes  Substance use disorder: Yes  Symptoms: anhedonia, impulsivity, insomnia  Family history of completed suicide or attempted suicide: Yes, possible  Accessibility to firearms: No  Interpersonal factors: Relationship challenges, family dynamic challenges     Protective factors  Ability to cope with the stress: Yes  Family " responsibility and supportive:Yes  Positive therapeutic relationships: Yes  Social support:Yes  Tenriism beliefs:  No  Connectedness with mental health providers: Yes     Homicidal Risk  Acute: Low  Chronic: Low  Imminent: Low    Assessment     Yue Carbone has a past psychiatry history of MDD, BRADLEY, bipolar disorder who has been referred for medication management from primary care provider.  No previous psychiatric inpatient hospitalizations.  1 previous suicide attempt via ingestion around age 20 or 21 years old.  Suicidal ideation that began at a young age that has been passive in nature.  No longer experiencing suicidal ideation since the loss of her friend in 2021.  Knows the impact suicide can have on individuals.  If suicidal ideation were to occur thoughts are passive and fleeting.  History of self harming behaviors of cutting.  Chemical health history significant for experimentation.  Frequent use of cannabis.  Trauma history disclosed including the loss of her friend to fentanyl overdose in 2021.     Yue Carbone reports she didn't tolerate duloxetine as a cause increased anxiety similar to citalopram but not as worse.  Stopped duloxetine and anxiety resolved.  She continue to discuss various aspects of her life.  This provider questioned if borderline personality disorder has ever been brought up.  She was hesitant but stated she has thought about this as a possible diagnosis and makes more sense than bipolar disorder that she has previously been diagnosed with.  Screening tool was positive.  We discussed borderline personality disorder at length today.  Will provide Namely resources through [employer] for her to review.  We discussed treatment options which included DBT.  We discussed utilizing lamotrigine as an antidepressant, antianxiety medication and mood stabilizer that may be beneficial for the irritability that she experiences.  I do still feel that there is underlying ADHD.  She has been  attempting to be tested through [employer] as a take insurance and is a more affordable option however they are not taking new patients at this time and she continues to call back for openings.  I did ask for her to complete a release of information so I can coordinate care regarding borderline personality disorder consideration and ADHD possibility.  Patient in agreement and will complete.  No imminent safety concerns identified today.     Provisional diagnosis to borderline personality disorder. Pt has emotional instability with great fluctuations which is amplified by an invalidating environment such as childhood experiences. Pt demonstrates interpersonal hypersensitivity. Hx of chronic mood lability, impulsivity and abandonment.  Chronic struggles with realtionships.  Presentation can be immature No overt psychosis current present. Diagnostically- borderline personality disorders appears to be the primary diagnosis. I do not appreciate any distinct mood episodes in her hx. Mood symptoms are reactive and interpersonal difficulties      Screening Tool:  Zanarini Scale for Borderline Personality Disorder    1) Have any of your closest relationships been troubled by a lot of arguments or repeated break-ups: Yes     2) Have you deliberately hurt yourself physically? How about made a suicide attempt? Yes     3) Have you had at least two other problems with impulsivity? Yes     4) Have you been extremely paul?Yes     5) Have you felt very angry a lot of the time? How about often acted in an angry or sarcastic manner? Yes     6) Have you often been distrustful of other people? Yes     7) Have you frequently felt unreal or as if things around you were unreal? Yes     8) Have you chronically felt empty? Yes     9) Have you often felt that you had no idea of who you are or that you have no identity? Yes     10) Have you made desperate efforts to avoid feeling abandoned or being abandoned? Yes     Total:  10/10      Pharmacologic:   3/6/2024: + Wellbutrin , buspirone 7.5 mg twice daily (insurance would not cover 7.5 mg tablets)  4/12/2024: + Wellbutrin  mg; advance buspirone 15 mg twice daily  05/31/24: stop wellbutrin XL 150mg (taper); + fluoxetine   8/2/2024: Stop fluoxetine, start duloxetine   8/20/2024: Patient stopped duloxetine due to increased anxiety which resolved    - Start lamotrigine  Step 1: Lamotrigine 25 mg tablet, take 1 tab daily for 14 days  Step 2: Lamotrigine 25mg tablet, take 2 tablets (50 mg) daily thereafter     *If you miss more then 4 days of this medication, you will need to re start at Step1 and follow the same instructions.   *We reviewed risk/benefit/warning signs of Pacheco Johnsons Syndrome     - BuSpar 15 mg by mouth twice daily  - Hydroxyzine 25 to 50 mg twice daily as needed for anxiety or sleep  -Trazodone 50mg by mouth at bedtime as needed      Psychosocial: Would benefit from individual therapy with focus of Dialectical Behavior Therapy (DBT). DBT would be helpful in addressing emotional regulation, distress tolerance, mindfulness, and interpersonal effectiveness.    -Continue individual therapy           Diagnosis       Borderline personality disorder (provisional diagnosis  MDD, recurrent, mild to moderate  BRADLEY        Rule out cannabis use disorder  Rule out ADHD*    Plan         1) Medications:   - Start lamotrigine  Step 1: Lamotrigine 25 mg tablet, take 1 tab daily for 14 days  Step 2: Lamotrigine 25mg tablet, take 2 tablets (50 mg) daily thereafter     *If you miss more then 4 days of this medication, you will need to re start at Step1 and follow the same instructions.   *We reviewed risk/benefit/warning signs of Pacheco Johnsons Syndrome     - BuSpar 15 mg by mouth twice daily  - Hydroxyzine 25 to 50 mg twice daily as needed for anxiety or sleep  -Trazodone 50mg by mouth at bedtime as needed                  MNPMP: I have queried the [place] and/or [place] Prescription  Monitoring Program for this patient for the preceding 12 months, or reviewed the report provided by my proxy delegate. I have not identified any concerns.  2) Risk vs benefits of medications reviewed: Yes  3) Life style modifications: sleep hygiene, exercise, healthy diet  4) Medical concerns:    - No acute concerns  5) Other:   -Continue individual therapy > DBT focus  -Recommend ADHD testing > pt to follow up on referral   6) Refrain from drinking alcohol and/or use of drugs.   7) Please secure all prescription and OTC medications, sharps, and caustic substances. Please remove all firearms and ammunition.  8) Review outside records, get SLOANE's, coordinate with outside providers  - Obtain SLOANE for [facility] in [place]  9) In case of emergency call 911 or go to the nearest ER, this includes patient voicing thought of harming self or others as well as additional safety concerns   10) Follow-up: 4 to 6 weeks, or sooner if needed.      Administrative Billing:   Supportive therapy was provided, focusing on reflective listening and solution focused problem solving.    Total time preparing to see this patient, face-to-face time, documenting in the EHR, and coordinating care time on the same calendar date: 34 minutes    The longitudinal plan of care for the diagnosis(es)/condition(s) as documented were addressed during this visit. Due to the added complexity in care, I will continue to support Yue in the subsequent management and with ongoing continuity of care.      Signed:   MALLORIE Salter CNP on 9/13/2024 at 2:32 PM     Disclaimer: This note consists of symbols derived from keyboarding, dictation and/or voice recognition software. As a result, there may be errors in the script that have gone undetected. Please consider this when interpreting information found in this chart.

## 2024-09-16 ENCOUNTER — MYC MEDICAL ADVICE (OUTPATIENT)
Dept: PSYCHIATRY | Facility: CLINIC | Age: 29
End: 2024-09-16
Payer: COMMERCIAL

## 2024-09-19 NOTE — TELEPHONE ENCOUNTER
RN received notification from this patient that she was having difficulty with her email and may have not received the SLOANE and instructions emailed to her 9/17/24.       RN emailed this patient a second time a blank SLOANE and instructions to [email or web address]   2.  RN also sent the patient a blank SLOANE and instructions through the [place] mail to:   Yue Carbone  [address] [place] [address]    Kenny Fairchild RN on 9/19/2024 at 12:17 PM    3

## 2024-09-25 ENCOUNTER — MYC MEDICAL ADVICE (OUTPATIENT)
Dept: PSYCHIATRY | Facility: CLINIC | Age: 29
End: 2024-09-25
Payer: COMMERCIAL

## 2024-09-25 DIAGNOSIS — F41.1 GAD (GENERALIZED ANXIETY DISORDER): ICD-10-CM

## 2024-09-25 NOTE — TELEPHONE ENCOUNTER
Patient has had some life changes and she is experiencing some increased anxiety. The past few days she is in a state of panic, chest pain, poor communication and sleep issues. She had to take the day off on Monday and she slept all day.     She said it is getting progressively worse but it's not at a horrible level. She's not breaking down or losing control. She says it's mild to moderate symptoms.     She is wondering if there is something she can do or take?      Last visit: 1) Medications:   - Start lamotrigine  Step 1: Lamotrigine 25 mg tablet, take 1 tab daily for 14 days  Step 2: Lamotrigine 25mg tablet, take 2 tablets (50 mg) daily thereafter      *If you miss more then 4 days of this medication, you will need to re start at Step1 and follow the same instructions.   *We reviewed risk/benefit/warning signs of Pacheco Johnsons Syndrome      - BuSpar 15 mg by mouth twice daily  - Hydroxyzine 25 to 50 mg twice daily as needed for anxiety or sleep  -Trazodone 50mg by mouth at bedtime as needed     Radha Garcia RN on 9/25/2024 at 1:26 PM

## 2024-09-26 RX ORDER — BUSPIRONE HYDROCHLORIDE 10 MG/1
20 TABLET ORAL 2 TIMES DAILY
Qty: 120 TABLET | Refills: 1 | Status: SHIPPED | OUTPATIENT
Start: 2024-09-26

## 2024-10-08 ENCOUNTER — MYC MEDICAL ADVICE (OUTPATIENT)
Dept: PSYCHIATRY | Facility: CLINIC | Age: 29
End: 2024-10-08
Payer: COMMERCIAL

## 2024-10-08 ENCOUNTER — TELEPHONE (OUTPATIENT)
Dept: PSYCHIATRY | Facility: CLINIC | Age: 29
End: 2024-10-08
Payer: COMMERCIAL

## 2024-10-08 NOTE — TELEPHONE ENCOUNTER
Reviewed the medical record and there is not an SLOANE authorizing communication between [facility] and patient's therapist     Notified the patient via [email or web address] to see what she'd like to do regarding communication with her therapist.     Awaiting patient's response.     Next RN to follow up on SLOANE and see if patient has responded to [employer] message.         TIESHA PARR RN on 10/8/2024 at 11:18 AM

## 2024-10-08 NOTE — TELEPHONE ENCOUNTER
Reason for call:  Other   Patient called regarding (reason for call): call back  Additional comments: Therapist Yasmin called re: wanting to discuss case coordination with Sadaf for this patient    Phone number to reach patient:  Other phone number:      [phone]       Best Time:  any- in usually 930am-7pm     Can we leave a detailed message on this number?  YES    Travel screening: Not Applicable

## 2024-10-10 NOTE — TELEPHONE ENCOUNTER
RN reviewed documentation within this encounter regarding Therapist request to open communication with Sdaaf Cantu CNP.       RN sent the patient a second [employer] message with instructions for filling out a SLOANE if the patients is agreeable to this.  Please refer to 10/8/24 [employer] encounter for details.    Kenny Fairchild RN on 10/10/2024 at 8:59 AM

## 2024-10-11 ENCOUNTER — PATIENT OUTREACH (OUTPATIENT)
Dept: CARE COORDINATION | Facility: CLINIC | Age: 29
End: 2024-10-11
Payer: COMMERCIAL

## 2024-10-23 ENCOUNTER — PATIENT OUTREACH (OUTPATIENT)
Dept: MIDWIFE SERVICES | Facility: CLINIC | Age: 29
End: 2024-10-23
Payer: COMMERCIAL

## 2024-10-25 ENCOUNTER — PATIENT OUTREACH (OUTPATIENT)
Dept: CARE COORDINATION | Facility: CLINIC | Age: 29
End: 2024-10-25
Payer: COMMERCIAL

## 2024-11-25 ENCOUNTER — PATIENT OUTREACH (OUTPATIENT)
Dept: FAMILY MEDICINE | Facility: CLINIC | Age: 29
End: 2024-11-25
Payer: COMMERCIAL

## 2024-11-25 DIAGNOSIS — N87.1 MODERATE CERVICAL DYSPLASIA, HISTOLOGICALLY CONFIRMED: Primary | ICD-10-CM

## 2024-12-10 ENCOUNTER — TELEPHONE (OUTPATIENT)
Dept: PSYCHIATRY | Facility: CLINIC | Age: 29
End: 2024-12-10
Payer: COMMERCIAL

## 2024-12-10 NOTE — TELEPHONE ENCOUNTER
Reason for call:  Other     Patient called regarding (reason for call): call back    Additional comments: Pt stated that they are very anxious, their appointment had to be pushed back due to provider out of office. Would like to consult with nurse for help with the anxiety until appointment which is December 30th.    Phone number to reach patient:  Home number on file [phone] (home)    Best Time:  ANY    Can we leave a detailed message on this number?  YES    Travel screening: Not Applicable

## 2024-12-11 ENCOUNTER — MYC MEDICAL ADVICE (OUTPATIENT)
Dept: PSYCHIATRY | Facility: CLINIC | Age: 29
End: 2024-12-11
Payer: COMMERCIAL

## 2024-12-11 NOTE — TELEPHONE ENCOUNTER
Provider to send patient [email or web address] message.  Attempted to reach patient at 1:58 PM due to last-minute canceled appointment on schedule at 2 PM.  Left voicemail.  Would still like patient placed on wait list for sooner appointment.  MALLORIE Salter CNP on 12/11/2024 at 2:00 PM

## 2024-12-11 NOTE — TELEPHONE ENCOUNTER
"    1) Per Chart Review, pt's RN spoke with pt at [phone]. Pt states for the past 1.5 - 2 weeks she has been having increased difficulty sleeping again.     2) Pt states that her sleep is very inconsistent. If she is able to fall asleep it's not until around 12:00 AM on average and also wakes up too early.     3) States she feels groggy and un-rested even if she does get an occasional 9 hours of sleep.     4) Pt states that she takes two (100 MG) traZODone (DESYREL) 50 MG tablets nightly around 7:00 PM along with one (25 MG)  hydrOXYzine HCl (ATARAX) 25 MG tablet. Also utilizes SleepyTime tea.     5) Pt is wondering about having the trazodone dose increased.     6) Pt also states that \"everything feels chaotic.\" States she moved about a month ago, but otherwise cannot identify any changes/triggers within the past couple weeks.     7) Pt reports that this change is not related to her menstrual cycle, as she has an IUD and does not have periods.     8) RN advised pt that the clinic's goal is to address patient concerns within 2 business days and encouraged pt to go to hear nearest Emergency Department if at any point she starts to feel unsafe for any reason, such as while driving, or experiences thought processes/perception changes. Pt verbalized understanding.     9) Routing to provider for review.     10) Routing to [facility], as pt would like to be placed on the cancellation waitlist in case an earlier appointment becomes available.     Mercedes Castellanos RN on 12/11/2024 at 1:30 PM    "

## 2024-12-22 ENCOUNTER — HEALTH MAINTENANCE LETTER (OUTPATIENT)
Age: 29
End: 2024-12-22

## 2024-12-30 ENCOUNTER — VIRTUAL VISIT (OUTPATIENT)
Dept: PSYCHIATRY | Facility: CLINIC | Age: 29
End: 2024-12-30
Payer: COMMERCIAL

## 2024-12-30 DIAGNOSIS — F33.0 MAJOR DEPRESSIVE DISORDER, RECURRENT EPISODE, MILD (H): Primary | ICD-10-CM

## 2024-12-30 DIAGNOSIS — R41.840 ATTENTION AND CONCENTRATION DEFICIT: ICD-10-CM

## 2024-12-30 DIAGNOSIS — F41.1 GAD (GENERALIZED ANXIETY DISORDER): ICD-10-CM

## 2024-12-30 RX ORDER — LAMOTRIGINE 100 MG/1
100 TABLET ORAL DAILY
Qty: 30 TABLET | Refills: 2 | Status: SHIPPED | OUTPATIENT
Start: 2024-12-30

## 2024-12-30 RX ORDER — BUSPIRONE HYDROCHLORIDE 10 MG/1
20 TABLET ORAL 2 TIMES DAILY
Qty: 120 TABLET | Refills: 1 | Status: SHIPPED | OUTPATIENT
Start: 2024-12-30

## 2024-12-30 RX ORDER — GABAPENTIN 100 MG/1
100 CAPSULE ORAL AT BEDTIME
Qty: 30 CAPSULE | Refills: 2 | Status: SHIPPED | OUTPATIENT
Start: 2024-12-30

## 2024-12-30 RX ORDER — TRAZODONE HYDROCHLORIDE 50 MG/1
25-100 TABLET, FILM COATED ORAL
Qty: 30 TABLET | Refills: 2 | Status: SHIPPED | OUTPATIENT
Start: 2024-12-30

## 2024-12-30 ASSESSMENT — ANXIETY QUESTIONNAIRES
8. IF YOU CHECKED OFF ANY PROBLEMS, HOW DIFFICULT HAVE THESE MADE IT FOR YOU TO DO YOUR WORK, TAKE CARE OF THINGS AT HOME, OR GET ALONG WITH OTHER PEOPLE?: EXTREMELY DIFFICULT
GAD7 TOTAL SCORE: 19
1. FEELING NERVOUS, ANXIOUS, OR ON EDGE: NEARLY EVERY DAY
7. FEELING AFRAID AS IF SOMETHING AWFUL MIGHT HAPPEN: MORE THAN HALF THE DAYS
GAD7 TOTAL SCORE: 19
5. BEING SO RESTLESS THAT IT IS HARD TO SIT STILL: MORE THAN HALF THE DAYS
2. NOT BEING ABLE TO STOP OR CONTROL WORRYING: NEARLY EVERY DAY
6. BECOMING EASILY ANNOYED OR IRRITABLE: NEARLY EVERY DAY
7. FEELING AFRAID AS IF SOMETHING AWFUL MIGHT HAPPEN: MORE THAN HALF THE DAYS
IF YOU CHECKED OFF ANY PROBLEMS ON THIS QUESTIONNAIRE, HOW DIFFICULT HAVE THESE PROBLEMS MADE IT FOR YOU TO DO YOUR WORK, TAKE CARE OF THINGS AT HOME, OR GET ALONG WITH OTHER PEOPLE: EXTREMELY DIFFICULT
3. WORRYING TOO MUCH ABOUT DIFFERENT THINGS: NEARLY EVERY DAY
4. TROUBLE RELAXING: NEARLY EVERY DAY
GAD7 TOTAL SCORE: 19

## 2024-12-30 ASSESSMENT — PAIN SCALES - GENERAL: PAINLEVEL_OUTOF10: MODERATE PAIN (4)

## 2024-12-30 ASSESSMENT — PATIENT HEALTH QUESTIONNAIRE - PHQ9
SUM OF ALL RESPONSES TO PHQ QUESTIONS 1-9: 20
10. IF YOU CHECKED OFF ANY PROBLEMS, HOW DIFFICULT HAVE THESE PROBLEMS MADE IT FOR YOU TO DO YOUR WORK, TAKE CARE OF THINGS AT HOME, OR GET ALONG WITH OTHER PEOPLE: EXTREMELY DIFFICULT
SUM OF ALL RESPONSES TO PHQ QUESTIONS 1-9: 20

## 2024-12-30 NOTE — NURSING NOTE
Is the patient currently in the state of [place]? YES    Current patient location: [address] [place] [address] [address] [place] [address]    Visit mode:VIDEO    If the visit is dropped, the patient can be reconnected by: VIDEO VISIT: Text to cell phone:   Telephone Information:   Mobile [phone]       Will anyone else be joining the visit? No  (If patient encounters technical issues they should call [phone])    Are changes needed to the allergy or medication list? Pt stated no changes to allergies and Pt stated no med changes    Are refills needed on medications prescribed by this physician? Discuss with Provider    Rooming Documentation: Questionnaire(s) completed.    Reason for visit: SHERRY [place], VVF

## 2024-12-30 NOTE — PROGRESS NOTES
"    Virtual Visit Details     Type of service:  Video Visit     Originating Location (pt. Location): Home    Distant Location (provider location):  Off-site  Platform used for Video Visit: LibriLoop          OUTPATIENT PSYCHIATRIC FOLLOW-UP      2024     Provider: MALLORIE Salter CNP      Appointment Start Time: 10:30 AM  Appointment End Time: 10:52 AM  Name: Yue Carbone   : 1995                    Preferred Name: Yue      Screening Tools           2024     9:56 AM 2024     1:32 PM 2024     2:46 PM   PHQ   PHQ-9 Total Score 20  18 16   Q9: Thoughts of better off dead/self-harm past 2 weeks Not at all Not at all Not at all       Patient-reported         2024     9:57 AM 2024     2:35 PM 3/6/2024     9:44 AM   BRADLEY-7 SCORE   Total Score 19 (severe anxiety) 19 (severe anxiety) 15 (severe anxiety)   Total Score 19  19 15       Patient-reported     PROMIS 10-Global Health (only subscores and total score):       3/6/2024     9:49 AM 2024     2:37 PM 2024     9:58 AM   PROMIS-10 Scores Only   Global Mental Health Score 7 5 6   Global Physical Health Score 11 11 12   PROMIS TOTAL - SUBSCORES 18 16 18          History of Present Illness      Patient attended the session alone.     Interim History:  I last saw Yue Carbone for outpatient psychiatry follow-up on 24. During that appointment, we started lamictal    Current stressors include: Symptoms and Relationship Difficulties     Coping mechanisms and supports include: Therapy, Hobbies, and Friends    Side effects: Denies     Medication adherence: Reports good med adherence.    Psychiatric Review of Systems      Yue Carbone reports mood has been: \"Okay\"    - Moved. Some management issues, neighborhood issues. Outside more in new location.   - Started lamictal. Tolerating well.     Depression has been:   - Shifts with emotions.     Anxiety has been:   - Anxiety goes in waves  - Heightened with ex " "coming into town  - Chest discomfort  - Sweating, trembling.   - Difficulty with decision making.     Sleep has been:   - Stable.     Radha sxs: Denies    Psychosis sxs: Denies    ADHD sxs: Procrastination. Disorganized.     PTSD sxs: Denies    SI/SIB: Denies suicidal ideation. Denies plan or intent.Denies SIB urges. Denies HI.       Medications Prior to Appointment       Current Outpatient Medications   Medication Sig Dispense Refill    busPIRone (BUSPAR) 10 MG tablet Take 2 tablets (20 mg) by mouth 2 times daily. 120 tablet 1    clindamycin (CLEOCIN T) 1 % external lotion APPLY TOPICALLY TO THE AFFECTED AREA EVERY MORNING      doxycycline monohydrate (MONODOX) 100 MG capsule TAKE 1 CAPSULE BY MOUTH TWICE DAILY FOR 2 MONTHS WITH FOOD ALREADY IN THE STOMACH      hydrOXYzine HCl (ATARAX) 25 MG tablet 1-2 tablets twice daily as needed for anxiety 120 tablet 1    lamoTRIgine (LAMICTAL) 25 MG tablet Take 1 tablet by mouth daily for 14 days then advance to 2 tablets by mouth thereafter 60 tablet 1    metroNIDAZOLE (METROCREAM) 0.75 % external cream APPLY TOPICALLY TO THE AFFECTED AREA EVERY NIGHT AT BEDTIME      spironolactone (ALDACTONE) 100 MG tablet Take 100 mg by mouth At Bedtime      traZODone (DESYREL) 50 MG tablet Take 0.5-2 tablets ( mg) by mouth nightly as needed for sleep 30 tablet 2          Previous medication trials include but not limited to:  -Abilify  -Citalopram  -Pristiq  -Depakote  -Hydroxyzine  -Lorazepam  -Xanax                 Medication Compliance: \"Miss some\"                 Pharmacogenomic Testing Completed: No         Medical History      History of head injuries: Yes. Concussions. Sports related   History of seizures: No  History of cardiac events: No  History of Tardive Dyskinesia: No     - Hx of syncope episodes.          Past Medical History:   Diagnosis Date    Known health problems: none 02/10/2023        Surgery:   Past Surgical History:   Procedure Laterality Date    NO HISTORY OF " SURGERY  02/10/2023     Primary Care Provider: Naz Catherine MD        Social History      Current Living situation: [facility] with self.    Current use of drugs or alcohol:   - Alcohol social x1. Does miss at times. More social 1 glass of wine.   - THC. Evening.  Less.     Tobacco use: Vaping    Employment: Yes.  Full-time dermatology clinic     Relationship Status: Single    Vitals      Not obtained d/t virtual visit.     There were no vitals taken for this visit.    Labs        Most recent laboratory results reviewed and pertinent results include:   Lab on 03/08/2024   Component Date Value Ref Range Status    hCG Urine Qualitative 03/08/2024 Negative  Negative Final    This test is for screening purposes.  Results should be interpreted along with the clinical picture.  Confirmation testing is available if warranted by ordering [id number], HCG Quantitative Pregnancy.   Office Visit on 03/08/2024   Component Date Value Ref Range Status    Neisseria gonorrhoeae 03/08/2024 Negative  Negative Final    Negative for N. gonorrhoeae rRNA by transcription mediated amplification. A negative result by transcription mediated amplification does not preclude the presence of C. trachomatis infection because results are dependent on proper and adequate collection, absence of inhibitors and sufficient rRNA to be detected.    Chlamydia trachomatis 03/08/2024 Negative  Negative Final    A negative result by transcription mediated amplification does not preclude the presence of C. trachomatis infection because results are dependent on proper and adequate collection, absence of inhibitors and sufficient rRNA to be detected.   Office Visit on 02/22/2024   Component Date Value Ref Range Status    hCG Urine Qualitative 02/22/2024 Negative  Negative Final    This test is for screening purposes.  Results should be interpreted along with the clinical picture.  Confirmation testing is available if warranted by ordering [id number], HCG  Quantitative Pregnancy.   Office Visit on 11/10/2023   Component Date Value Ref Range Status    Chlamydia trachomatis 11/10/2023 Negative  Negative Final    A negative result by transcription mediated amplification does not preclude the presence of C. trachomatis infection because results are dependent on proper and adequate collection, absence of inhibitors and sufficient rRNA to be detected.    Trichomonas 11/10/2023 Absent  Absent Final    Yeast 11/10/2023 Absent  Absent Final    Clue Cells 11/10/2023 Absent  Absent Final    WBCs/high power field 11/10/2023 2+ (A)  None Final    Interpretation 11/10/2023 Negative for Intraepithelial Lesion or Malignancy (NILM)    Final    Comment 11/10/2023    Final                    Value:This result contains rich text formatting which cannot be displayed here.    Specimen Adequacy 11/10/2023 Satisfactory for evaluation, endocervical/transformation zone component absent   Final    Clinical Information 11/10/2023    Final                    Value:This result contains rich text formatting which cannot be displayed here.    LMP/Menopause Date 11/10/2023    Final                    Value:This result contains rich text formatting which cannot be displayed here.    Reflex Testing 11/10/2023 Yes if ASCUS   Final    Previous Abnormal? 11/10/2023    Final                    Value:This result contains rich text formatting which cannot be displayed here.    Performing Labs 11/10/2023    Final                    Value:This result contains rich text formatting which cannot be displayed here.    Other HR HPV 11/10/2023 Negative  Negative Final    HPV16 DNA 11/10/2023 Negative  Negative Final    HPV18 DNA 11/10/2023 Negative  Negative Final    FINAL DIAGNOSIS 11/10/2023    Final                    Value:This result contains rich text formatting which cannot be displayed here.     Most recent EKG from 8/9/2023 reviewed. QTc interval 427.        Medical Review of Systems      Pertinent  "positives noted in HPI and below:   Review of Systems   All other systems reviewed and are negative.       IUD No LMP recorded.    Pregnant / Breastfeeding: No       Mental Status Exam      Appearance: awake, alert, adequately groomed, appeared stated age and no apparent distress  Attitude:  cooperative   Eye Contact:  good  Gait and Station: normal, no gross abnormalities noted by observation  Psychomotor Behavior:  no evidence of tardive dyskinesia, dystonia, or tics  Oriented to:  person, place, time, and situation  Attention Span and Concentration:  normal  Speech:  clear, coherent, regular rate, rhythm, and volume  Language: intact  Mood: \"Anxious\"  Affect:  appropriate and in normal range  Associations:  no loose associations  Thought Process:  logical, linear and goal oriented  Thought Content:  no evidence of suicidal ideation or homicidal ideation, no evidence of psychotic thought, no auditory hallucinations present and no visual hallucinations present  Recent and Remote Memory:  Intact to interview. Not formally assessed. No amnesia.  Fund of Knowledge: appropriate  Insight:  partial>full  Judgment:  intact, adequate for safety  Impulse Control:  intact         Risk Assessment         Suicide assessment  Acute: Low  Chronic:Low  Imminent: Low     Risk factors  History of suicide attempts: Yes  History of self-injurious behavior: Yes  Amrit. Axis I psychiatric diagnoses: Yes  Substance use disorder: Yes  Symptoms: anhedonia, impulsivity, insomnia  Family history of completed suicide or attempted suicide: Yes, possible  Accessibility to firearms: No  Interpersonal factors: Relationship challenges, family dynamic challenges     Protective factors  Ability to cope with the stress: Yes  Family responsibility and supportive:Yes  Positive therapeutic relationships: Yes  Social support:Yes  Hinduism beliefs:  No  Connectedness with mental health providers: Yes     Homicidal Risk  Acute: Low  Chronic: Low  Imminent: " Low    Assessment     Yue Carbone has a past psychiatry history of MDD, BRADLEY, bipolar disorder who has been referred for medication management from primary care provider.  No previous psychiatric inpatient hospitalizations.  1 previous suicide attempt via ingestion around age 20 or 21 years old.  Suicidal ideation that began at a young age that has been passive in nature.  No longer experiencing suicidal ideation since the loss of her friend in 2021.  Knows the impact suicide can have on individuals.  If suicidal ideation were to occur thoughts are passive and fleeting.  History of self harming behaviors of cutting.  Chemical health history significant for experimentation.  Frequent use of cannabis.  Trauma history disclosed including the loss of her friend to fentanyl overdose in 2021.     Yue Carbone reports she didn't tolerate duloxetine as a cause increased anxiety similar to citalopram but not as worse.  Stopped duloxetine and anxiety resolved.  She continue to discuss various aspects of her life.  This provider questioned if borderline personality disorder has ever been brought up.  She was hesitant but stated she has thought about this as a possible diagnosis and makes more sense than bipolar disorder that she has previously been diagnosed with.  Screening tool was positive.  We discussed borderline personality disorder at length today.  Will provide [facility] resources through [facility] for her to review.  We discussed treatment options which included DBT.  We discussed utilizing lamotrigine as an antidepressant, antianxiety medication and mood stabilizer that may be beneficial for the irritability that she experiences.  I do still feel that there is underlying ADHD.  She has been attempting to be tested through [facility] [employer] as a take insurance and is a more affordable option however they are not taking new patients at this time and she continues to call back for openings.  I did ask for her  to complete a release of information so I can coordinate care regarding borderline personality disorder consideration and ADHD possibility.  Patient in agreement and will complete.  No imminent safety concerns identified today.     Zanarini scale completed, provisional diagnosis of borderline personality disorder given last appointment.  Started lamotrigine.  Tolerating well.  Mild benefit.  Will titrate to 100 mg for further mood control.  Will also start gabapentin at bedtime for anxiety.  She reportedly sent SLOANE for therapy coordination of care but not yet received.  Will recomplete SLOANE and upload to [id number].  Reportedly had discussion with therapist who can see borderline personality traits as well as ADHD.  Would like to have more formal conversation with therapist regarding this.  Will place new referral for ADHD testing as there is still suspicion regarding this.  Reviewed risk versus benefit and side effects of medications today.  No imminent safety concerns identified.    Pharmacologic:   3/6/2024: + Wellbutrin , buspirone 7.5 mg twice daily (insurance would not cover 7.5 mg tablets)  4/12/2024: + Wellbutrin  mg; advance buspirone 15 mg twice daily  05/31/24: stop wellbutrin XL 150mg (taper); + fluoxetine   8/2/2024: Stop fluoxetine, start duloxetine   8/20/2024: Patient stopped duloxetine due to increased anxiety which resolved  9/13/24: + lamictal titration     - START lamotrigine 100mg by mouth daily.  - BuSpar 15 mg by mouth twice daily  - Hydroxyzine 25 to 50 mg twice daily as needed for anxiety or sleep  - Trazodone 50mg by mouth at bedtime as needed   - START gabapentin 100 mg by mouth at bedtime    Psychosocial: Would benefit from individual therapy with focus of Dialectical Behavior Therapy (DBT). DBT would be helpful in addressing emotional regulation, distress tolerance, mindfulness, and interpersonal effectiveness.    -Continue individual therapy           Diagnosis       Borderline  personality disorder (provisional diagnosis)  MDD, recurrent, mild to moderate  BRADLEY        Rule out cannabis use disorder  Rule out ADHD*    Plan         1) Medications:   - START lamotrigine 100mg by mouth daily.  - BuSpar 15 mg by mouth twice daily  - Hydroxyzine 25 to 50 mg twice daily as needed for anxiety or sleep  - Trazodone 50mg by mouth at bedtime as needed   - START gabapentin 100 mg by mouth at bedtime              MNPMP: I have queried the [place] and/or [place] Prescription Monitoring Program for this patient for the preceding 12 months, or reviewed the report provided by my proxy delegate. I have not identified any concerns.  2) Risk vs benefits of medications reviewed: Yes  3) Life style modifications: sleep hygiene, exercise, healthy diet  4) Medical concerns:    - No acute concerns  5) Other:   -Continue individual therapy > DBT focus  -Recommend ADHD testing > referral placed. Had been looking at [facility] but no availability   6) Refrain from drinking alcohol and/or use of drugs.   7) Please secure all prescription and OTC medications, sharps, and caustic substances. Please remove all firearms and ammunition.  8) Review outside records, get SLOANE's, coordinate with outside providers  - Obtain [facility] for [facility] in [place]  9) In case of emergency call 911 or go to the nearest ER, this includes patient voicing thought of harming self or others as well as additional safety concerns   10) Follow-up: 6-8 weeks, or sooner if needed.      Administrative Billing:   Supportive therapy was provided, focusing on reflective listening and solution focused problem solving.    Total time preparing to see this patient, face-to-face time, documenting in the EHR, and coordinating care time on the same calendar date: 30 minutes    The longitudinal plan of care for the diagnosis(es)/condition(s) as documented were addressed during this visit. Due to the added complexity in care, I will continue to support Yue  in the subsequent management and with ongoing continuity of care.      Signed:   MALLORIE Salter CNP on 12/30/2024 at 10:55 AM     Disclaimer: This note consists of symbols derived from keyboarding, dictation and/or voice recognition software. As a result, there may be errors in the script that have gone undetected. Please consider this when interpreting information found in this chart.      Answers submitted by the patient for this visit:  Patient Health Questionnaire (Submitted on 12/30/2024)  If you checked off any problems, how difficult have these problems made it for you to do your work, take care of things at home, or get along with other people?: Extremely difficult  PHQ9 TOTAL SCORE: 20  Patient Health Questionnaire (G7) (Submitted on 12/30/2024)  BRADLEY 7 TOTAL SCORE: 19

## 2025-01-14 DIAGNOSIS — F41.1 GAD (GENERALIZED ANXIETY DISORDER): ICD-10-CM

## 2025-01-14 NOTE — TELEPHONE ENCOUNTER
Date of Last Office Visit: 12/30/2024  Date of Next Office Visit:  02/21/2025  No shows since last visit: No  More than one patient-initiated cancellation (with reschedule) since last seen in clinic? No    []Medication refilled per  Medication Refill in Ambulatory Care  policy.  []Medication unable to be refilled by RN due to criteria not met as indicated below:    []Eligibility: has not had a provider visit within last 6 months   []Supervision: no future appointment; < 7 days before next appointment   []Compliance: no shows; cancellations; lapse in therapy   []Verification: order discrepancy; may need modification...   [] > 30-day supply request   []Advanced refill request: > 7 days before refill date   []Controlled medication   []Medication not included in policy   []Review: new med; med adjusted <= 30 days; safety alert; requires lab monitoring...   [x]Scope of Practice: refill request processed by LPN/MA   []Other:      Medication(s) requested:     -  hydrOXYzine HCl (ATARAX) 25 MG tablet   Date last ordered: 09/13/2024  Qty: 120 tablet  Refills: 1  Appropriate for refill? Yes      Any Controlled Substance(s)? No      Requested medication(s) verified as identical to current order? Yes    Any lapse in adherence to medication(s) greater than 5 days? No      Additional action taken? routed encounter to provider for review.      Last visit treatment plan:   Plan         1) Medications:   - START lamotrigine 100mg by mouth daily.  - BuSpar 15 mg by mouth twice daily  - Hydroxyzine 25 to 50 mg twice daily as needed for anxiety or sleep  - Trazodone 50mg by mouth at bedtime as needed   - START gabapentin 100 mg by mouth at bedtime              MNPMP: I have queried the [place] and/or [place] Prescription Monitoring Program for this patient for the preceding 12 months, or reviewed the report provided by my proxy delegate. I have not identified any concerns.  2) Risk vs benefits of medications reviewed: Yes  3) Life style  modifications: sleep hygiene, exercise, healthy diet  4) Medical concerns:    - No acute concerns  5) Other:   -Continue individual therapy > DBT focus  -Recommend ADHD testing > referral placed. Had been looking at [facility] but no availability   6) Refrain from drinking alcohol and/or use of drugs.   7) Please secure all prescription and OTC medications, sharps, and caustic substances. Please remove all firearms and ammunition.  8) Review outside records, get SLOANE's, coordinate with outside providers  - Obtain S[facility]ANE for [facility] in [place]  9) In case of emergency call 911 or go to the nearest ER, this includes patient voicing thought of harming self or others as well as additional safety concerns   10) Follow-up: 6-8 weeks, or sooner if needed.    Any medication(s) require lab monitoring? No

## 2025-01-20 RX ORDER — HYDROXYZINE HYDROCHLORIDE 25 MG/1
TABLET, FILM COATED ORAL
Qty: 120 TABLET | Refills: 1 | Status: SHIPPED | OUTPATIENT
Start: 2025-01-20

## 2025-02-07 PROBLEM — N87.1 MODERATE CERVICAL DYSPLASIA, HISTOLOGICALLY CONFIRMED: Status: ACTIVE | Noted: 2022-11-30

## 2025-02-11 ENCOUNTER — PATIENT OUTREACH (OUTPATIENT)
Dept: CARE COORDINATION | Facility: CLINIC | Age: 30
End: 2025-02-11

## 2025-03-26 ENCOUNTER — VIRTUAL VISIT (OUTPATIENT)
Dept: PSYCHIATRY | Facility: CLINIC | Age: 30
End: 2025-03-26
Payer: COMMERCIAL

## 2025-03-26 DIAGNOSIS — F41.1 GAD (GENERALIZED ANXIETY DISORDER): ICD-10-CM

## 2025-03-26 DIAGNOSIS — F60.3 BORDERLINE PERSONALITY DISORDER (H): Primary | ICD-10-CM

## 2025-03-26 DIAGNOSIS — R41.840 ATTENTION AND CONCENTRATION DEFICIT: ICD-10-CM

## 2025-03-26 DIAGNOSIS — F33.0 MAJOR DEPRESSIVE DISORDER, RECURRENT EPISODE, MILD: ICD-10-CM

## 2025-03-26 RX ORDER — LAMOTRIGINE 150 MG/1
150 TABLET ORAL DAILY
Qty: 30 TABLET | Refills: 1 | Status: SHIPPED | OUTPATIENT
Start: 2025-03-26

## 2025-03-26 RX ORDER — GABAPENTIN 100 MG/1
100 CAPSULE ORAL 2 TIMES DAILY
Qty: 30 CAPSULE | Refills: 2 | Status: SHIPPED | OUTPATIENT
Start: 2025-03-26

## 2025-03-26 RX ORDER — BUSPIRONE HYDROCHLORIDE 10 MG/1
20 TABLET ORAL 2 TIMES DAILY
Qty: 120 TABLET | Refills: 1 | Status: SHIPPED | OUTPATIENT
Start: 2025-03-26

## 2025-03-26 ASSESSMENT — PAIN SCALES - GENERAL: PAINLEVEL_OUTOF10: MILD PAIN (3)

## 2025-03-26 NOTE — PROGRESS NOTES
"    Virtual Visit Details     Type of service:  Video Visit     Originating Location (pt. Location): Home    Distant Location (provider location):  Off-site  Platform used for Video Visit: Intoloop          OUTPATIENT PSYCHIATRIC FOLLOW-UP      3/26/2025      Provider: MALLORIE Salter CNP      Appointment Start Time: 8:01 AM  Appointment End Time: 8:30 AM  Name: Yue Carbone   : 1995                    Preferred Name: Yue      Screening Tools           3/26/2025     7:07 AM 2024     9:56 AM 2024     1:32 PM   PHQ   PHQ-9 Total Score 18  20  18   Q9: Thoughts of better off dead/self-harm past 2 weeks Not at all Not at all Not at all       Patient-reported         2024     9:57 AM 2024     2:35 PM 3/6/2024     9:44 AM   BRADLEY-7 SCORE   Total Score 19 (severe anxiety) 19 (severe anxiety) 15 (severe anxiety)   Total Score 19  19 15       Patient-reported     PROMIS 10-Global Health (only subscores and total score):       3/6/2024     9:49 AM 2024     2:37 PM 2024     9:58 AM   PROMIS-10 Scores Only   Global Mental Health Score 7 5 6   Global Physical Health Score 11 11 12   PROMIS TOTAL - SUBSCORES 18 16 18          History of Present Illness      Patient attended the session alone.     Interim History:  I last saw Yue Carbone for outpatient psychiatry follow-up on 24. During that appointment, we advanced lamictal and gabapentin    Current stressors include: Symptoms and Relationship Difficulties     Coping mechanisms and supports include: Therapy, Hobbies, and Friends    Side effects: Denies     Medication adherence: Reports good med adherence.    Psychiatric Review of Systems      Yue Carbone reports mood has been: \"Okay\"    Depression has been:   - Same, perhaps mild improvement  - Sense of emptiness.   - Feels less dramatic    Anxiety has been:   - Not using hydroxyzine as much.   - Still feels anxious but not as severe   - Can be overwhelmed at " "times.     Sleep has been:   - Stable.   - Hard time waking up. Giving self 10 minutes to wake up and leave.   - Getting ready for bed around 830, in bed around 930, unsure what time she falls asleep (no tv, phone)    Radha sxs: Denies    Psychosis sxs: Denies    ADHD sxs:   - Losing things more then typical  - Forgetful (several different appts, lost purse, phone - in public places)  - Hard time developing system in new apartment.     PTSD sxs: Denies    SI/SIB: Denies suicidal ideation. Denies plan or intent.Denies SIB urges. Denies HI.       Medications Prior to Appointment       Current Outpatient Medications   Medication Sig Dispense Refill    busPIRone (BUSPAR) 10 MG tablet Take 2 tablets (20 mg) by mouth 2 times daily. 120 tablet 1    clindamycin (CLEOCIN T) 1 % external lotion APPLY TOPICALLY TO THE AFFECTED AREA EVERY MORNING      doxycycline monohydrate (MONODOX) 100 MG capsule TAKE 1 CAPSULE BY MOUTH TWICE DAILY FOR 2 MONTHS WITH FOOD ALREADY IN THE STOMACH      gabapentin (NEURONTIN) 100 MG capsule Take 1 capsule (100 mg) by mouth at bedtime. 30 capsule 2    hydrOXYzine HCl (ATARAX) 25 MG tablet TAKE 1 TO 2 TABLETS BY MOUTH TWICE DAILY AS NEEDED FOR ANXIETY 120 tablet 1    lamoTRIgine (LAMICTAL) 100 MG tablet Take 1 tablet (100 mg) by mouth daily. 30 tablet 2    metroNIDAZOLE (METROCREAM) 0.75 % external cream APPLY TOPICALLY TO THE AFFECTED AREA EVERY NIGHT AT BEDTIME      spironolactone (ALDACTONE) 100 MG tablet Take 100 mg by mouth At Bedtime      traZODone (DESYREL) 50 MG tablet Take 0.5-2 tablets ( mg) by mouth nightly as needed for sleep. 30 tablet 2          Previous medication trials include but not limited to:  -Abilify  -Citalopram  -Pristiq  -Depakote  -Hydroxyzine  -Lorazepam  -Xanax                 Medication Compliance: \"Miss some\"                 Pharmacogenomic Testing Completed: No         Medical History      History of head injuries: Yes. Concussions. Sports related   History of " seizures: No  History of cardiac events: No  History of Tardive Dyskinesia: No     - Hx of syncope episodes.          Past Medical History:   Diagnosis Date    Known health problems: none 02/10/2023        Surgery:   Past Surgical History:   Procedure Laterality Date    NO HISTORY OF SURGERY  02/10/2023     Primary Care Provider: Naz Catherine MD        Social History      Current Living situation: [facility] with self.    Current use of drugs or alcohol:   - Alcohol. Binge drinking 3 weekends (1 day each weekend)  - THC. Evening, only. More often on weekends (3 times per day)     Tobacco use: Vaping    Employment: Yes.  Full-time dermatology clinic     Relationship Status: Single    Vitals      Not obtained d/t virtual visit.     There were no vitals taken for this visit.    Labs        Most recent laboratory results reviewed and pertinent results include:   Lab on 03/08/2024   Component Date Value Ref Range Status    hCG Urine Qualitative 03/08/2024 Negative  Negative Final    This test is for screening purposes.  Results should be interpreted along with the clinical picture.  Confirmation testing is available if warranted by ordering [id number], HCG Quantitative Pregnancy.   Office Visit on 03/08/2024   Component Date Value Ref Range Status    Neisseria gonorrhoeae 03/08/2024 Negative  Negative Final    Negative for N. gonorrhoeae rRNA by transcription mediated amplification. A negative result by transcription mediated amplification does not preclude the presence of C. trachomatis infection because results are dependent on proper and adequate collection, absence of inhibitors and sufficient rRNA to be detected.    Chlamydia trachomatis 03/08/2024 Negative  Negative Final    A negative result by transcription mediated amplification does not preclude the presence of C. trachomatis infection because results are dependent on proper and adequate collection, absence of inhibitors and sufficient rRNA to be  detected.   Office Visit on 02/22/2024   Component Date Value Ref Range Status    hCG Urine Qualitative 02/22/2024 Negative  Negative Final    This test is for screening purposes.  Results should be interpreted along with the clinical picture.  Confirmation testing is available if warranted by ordering [id number], HCG Quantitative Pregnancy.   Office Visit on 11/10/2023   Component Date Value Ref Range Status    Chlamydia trachomatis 11/10/2023 Negative  Negative Final    A negative result by transcription mediated amplification does not preclude the presence of C. trachomatis infection because results are dependent on proper and adequate collection, absence of inhibitors and sufficient rRNA to be detected.    Trichomonas 11/10/2023 Absent  Absent Final    Yeast 11/10/2023 Absent  Absent Final    Clue Cells 11/10/2023 Absent  Absent Final    WBCs/high power field 11/10/2023 2+ (A)  None Final    Interpretation 11/10/2023 Negative for Intraepithelial Lesion or Malignancy (NILM)    Final    Comment 11/10/2023    Final                    Value:This result contains rich text formatting which cannot be displayed here.    Specimen Adequacy 11/10/2023 Satisfactory for evaluation, endocervical/transformation zone component absent   Final    Clinical Information 11/10/2023    Final                    Value:This result contains rich text formatting which cannot be displayed here.    LMP/Menopause Date 11/10/2023    Final                    Value:This result contains rich text formatting which cannot be displayed here.    Reflex Testing 11/10/2023 Yes if ASCUS   Final    Previous Abnormal? 11/10/2023    Final                    Value:This result contains rich text formatting which cannot be displayed here.    Performing Labs 11/10/2023    Final                    Value:This result contains rich text formatting which cannot be displayed here.    Other HR HPV 11/10/2023 Negative  Negative Final    HPV16 DNA 11/10/2023 Negative   "Negative Final    HPV18 DNA 11/10/2023 Negative  Negative Final    FINAL DIAGNOSIS 11/10/2023    Final                    Value:This result contains rich text formatting which cannot be displayed here.     Most recent EKG from 8/9/2023 reviewed. QTc interval 427.        Medical Review of Systems      Pertinent positives noted in HPI and below:   Review of Systems   All other systems reviewed and are negative.       IUD No LMP recorded.    Pregnant / Breastfeeding: No       Mental Status Exam      Appearance: awake, alert, adequately groomed, appeared stated age and no apparent distress  Attitude:  cooperative   Eye Contact:  good  Gait and Station: normal, no gross abnormalities noted by observation  Psychomotor Behavior:  no evidence of tardive dyskinesia, dystonia, or tics  Oriented to:  person, place, time, and situation  Attention Span and Concentration:  normal  Speech:  clear, coherent, regular rate, rhythm, and volume  Language: intact  Mood: \"Overwhelmed\"  Affect:  appropriate and in normal range  Associations:  no loose associations  Thought Process:  logical, linear and goal oriented  Thought Content:  no evidence of suicidal ideation or homicidal ideation, no evidence of psychotic thought, no auditory hallucinations present and no visual hallucinations present  Recent and Remote Memory:  Intact to interview. Not formally assessed. No amnesia.  Fund of Knowledge: appropriate  Insight:  partial>full  Judgment:  intact, adequate for safety  Impulse Control:  intact         Risk Assessment         Suicide assessment  Acute: Low  Chronic:Low  Imminent: Low     Risk factors  History of suicide attempts: Yes  History of self-injurious behavior: Yes  Amrit. Axis I psychiatric diagnoses: Yes  Substance use disorder: Yes  Symptoms: anhedonia, impulsivity, insomnia  Family history of completed suicide or attempted suicide: Yes, possible  Accessibility to firearms: No  Interpersonal factors: Relationship challenges, " family dynamic challenges     Protective factors  Ability to cope with the stress: Yes  Family responsibility and supportive:Yes  Positive therapeutic relationships: Yes  Social support:Yes  Rastafarian beliefs:  No  Connectedness with mental health providers: Yes     Homicidal Risk  Acute: Low  Chronic: Low  Imminent: Low    Assessment     Yue Carbone has a past psychiatry history of MDD, BRADLEY, bipolar disorder who has been referred for medication management from primary care provider.  No previous psychiatric inpatient hospitalizations.  1 previous suicide attempt via ingestion around age 20 or 21 years old.  Suicidal ideation that began at a young age that has been passive in nature.  No longer experiencing suicidal ideation since the loss of her friend in 2021.  Knows the impact suicide can have on individuals.  If suicidal ideation were to occur thoughts are passive and fleeting.  History of self harming behaviors of cutting.  Chemical health history significant for experimentation.  Frequent use of cannabis.  Trauma history disclosed including the loss of her friend to fentanyl overdose in 2021.     Yue Carbone reports lamotrigine has been the first medication she has felt relief and benefit from compared to other medication trials for mood stabilization.  We will advance to 150 mg.  She does have realistic views on medications and the impact they can have overall.  We discussed lingering anxiety.  Consideration to optimizing buspirone versus utilizing gabapentin.  Will trial 100 mg twice daily of gabapentin.  We will hold trazodone.  Felt too groggy on this medication.  Reduced use of hydroxyzine as well.  Still remains high suspicion for ADHD and has testing scheduled for June.  Missing several appointments unintentionally.  For ADHD testing we discussed having family or friends set reminders as well to help her ensure she attends this appointment.    Pharmacologic:   3/6/2024: + Wellbutrin ,  buspirone 7.5 mg twice daily (insurance would not cover 7.5 mg tablets)  4/12/2024: + Wellbutrin  mg; advance buspirone 15 mg twice daily  05/31/24: stop wellbutrin XL 150mg (taper); + fluoxetine   8/2/2024: Stop fluoxetine, start duloxetine   8/20/2024: Patient stopped duloxetine due to increased anxiety which resolved  9/13/24: + lamictal titration   12/30/2024: + Lamictal 100 mg, + gabapentin 100 mg    - START lamotrigine 150 mg by mouth daily.  - BuSpar 10 mg, take 2 tablets by mouth twice daily  - Hydroxyzine 25 to 50 mg twice daily as needed for anxiety or sleep  - Trazodone 50mg by mouth at bedtime as needed *Uses if she can take before 730/8p 8:01 AM  - START gabapentin 100 mg by mouth twice daily    Psychosocial: Would benefit from individual therapy with focus of Dialectical Behavior Therapy (DBT). DBT would be helpful in addressing emotional regulation, distress tolerance, mindfulness, and interpersonal effectiveness.    -Continue individual therapy           Diagnosis       Borderline personality disorder (provisional diagnosis)  MDD, recurrent, mild to moderate  BRADLEY        Rule out cannabis use disorder  Rule out ADHD*    Plan         1) Medications:   - START lamotrigine 150 mg by mouth daily.  - BuSpar 10 mg, take 2 tablets by mouth twice daily  - Hydroxyzine 25 to 50 mg twice daily as needed for anxiety or sleep  - Trazodone 50mg by mouth at bedtime as needed *Uses if she can take before 730/8p 8:01 AM  - START gabapentin 100 mg by mouth twice daily              MNPMP: I have queried the [place] and/or [place] Prescription Monitoring Program for this patient for the preceding 12 months, or reviewed the report provided by my proxy delegate. I have not identified any concerns.  2) Risk vs benefits of medications reviewed: Yes  3) Life style modifications: sleep hygiene, exercise, healthy diet  4) Medical concerns:    - No acute concerns  5) Other:   -Continue individual therapy > DBT focus  -Recommend ADHD  testing > scheduled June 2025, [facility]  6) Refrain from drinking alcohol and/or use of drugs.   7) Please secure all prescription and OTC medications, sharps, and caustic substances. Please remove all firearms and ammunition.  8) Review outside records, get SLOANE's, coordinate with outside providers  - Obtain SLOANE for [facility] in [place]  9) In case of emergency call 911 or go to the nearest ER, this includes patient voicing thought of harming self or others as well as additional safety concerns   10) Follow-up: 6-8 weeks, or sooner if needed.      Administrative Billing:   Supportive therapy was provided, focusing on reflective listening and solution focused problem solving.    Total time preparing to see this patient, face-to-face time, documenting in the EHR, and coordinating care time on the same calendar date: 36 minutes    The longitudinal plan of care for the diagnosis(es)/condition(s) as documented were addressed during this visit. Due to the added complexity in care, I will continue to support Yue in the subsequent management and with ongoing continuity of care.      Signed:   MALLORIE Salter CNP on 3/26/2025 at 8:33 AM     Disclaimer: This note consists of symbols derived from keyboarding, dictation and/or voice recognition software. As a result, there may be errors in the script that have gone undetected. Please consider this when interpreting information found in this chart.

## 2025-03-26 NOTE — NURSING NOTE
Current patient location: [address] [place] [address]   [facility] [id number]    Is the patient currently in the state of [place]? YES    Visit mode: VIDEO    If the visit is dropped, the patient can be reconnected by:VIDEO VISIT: Text to cell phone:   Telephone Information:   Mobile [phone]       Will anyone else be joining the visit? NO  (If patient encounters technical issues they should call [phone])    Are changes needed to the allergy or medication list? Pt stated no changes to allergies and Pt stated no med changes    Are refills needed on medications prescribed by this physician? NO    Rooming Documentation:  Questionnaire(s) completed    Reason for visit: SHERRY MCFARLANDF

## 2025-04-17 ENCOUNTER — TELEPHONE (OUTPATIENT)
Dept: OBGYN | Facility: CLINIC | Age: 30
End: 2025-04-17
Payer: COMMERCIAL

## 2025-05-21 ENCOUNTER — TELEPHONE (OUTPATIENT)
Dept: FAMILY MEDICINE | Facility: CLINIC | Age: 30
End: 2025-05-21
Payer: COMMERCIAL

## 2025-05-21 NOTE — TELEPHONE ENCOUNTER
Patient Quality Outreach    Patient is due for the following:   Cervical Cancer Screening - PAP Needed  Physical Preventive Adult Physical    Action(s) Taken:   Schedule a Adult Preventative    Type of outreach:    Sent Super[employer] message.    Questions for provider review:    None         Cheryl Carbone CMA  Chart routed to None.

## 2025-06-05 DIAGNOSIS — F60.3 BORDERLINE PERSONALITY DISORDER (H): ICD-10-CM

## 2025-06-05 RX ORDER — LAMOTRIGINE 150 MG/1
150 TABLET ORAL DAILY
Qty: 30 TABLET | Refills: 1 | Status: SHIPPED | OUTPATIENT
Start: 2025-06-05

## 2025-06-05 NOTE — TELEPHONE ENCOUNTER
Date of Last Office Visit: 3/23/2025  Date of Next Office Visit: 7/10/2025  No shows since last visit: No  More than one patient-initiated cancellation (with reschedule) since last seen in clinic? No    []Medication refilled per  Medication Refill in Ambulatory Care  policy.  [x]Scope of Practice: refill request processed by LPN/MA  []Medication unable to be refilled by RN due to criteria not met as indicated below:    []Eligibility: has not had a provider visit within last 6 months   []Supervision: no future appointment; < 7 days before next appointment   []Compliance: no shows; cancellations; lapse in therapy   []Verification: order discrepancy; may need modification...   [] > 30-day supply request   []Advanced refill request: > 7 days before refill date   []Controlled medication   [x]Medication not included in policy   [x]Review: new med; med adjusted <= 30 days; safety alert; requires lab monitoring...   []Other:      Medication(s) requested:     -  lamoTRIgine (LAMICTAL) 150 MG tablet   Date last ordered: 3/26/2025  Qty: 30  Refills: 1       Disp Refills Start End LEON   lamoTRIgine (LAMICTAL) 150 MG tablet 30 tablet 1 3/26/2025 -- No   Sig - Route: Take 1 tablet (150 mg) by mouth daily. - Oral   Sent to pharmacy as: lamoTRIgine 150 MG Oral Tablet (LaMICtal)   Class: E-Prescribe       Any Controlled Substance(s)? No      Requested medication(s) verified as identical to current order? Yes    Any lapse in adherence to medication(s) greater than 5 days? No    As per dispense report, last filled on 5/7 for 30 days  LAMOTRIGINE 150MG TABLETS 05/07/2025 30 30 Units     Additional action taken? routed encounter to provider for review.      Last visit treatment plan:   1) Medications:   - START lamotrigine 150 mg by mouth daily.  - BuSpar 10 mg, take 2 tablets by mouth twice daily  - Hydroxyzine 25 to 50 mg twice daily as needed for anxiety or sleep  - Trazodone 50mg by mouth at bedtime as needed *Uses if she can take  "before 730/8p 8:01 AM  - START gabapentin 100 mg by mouth twice daily              MNPMP: I have queried the [place] and/or [place] Prescription Monitoring Program for this patient for the preceding 12 months, or reviewed the report provided by my proxy delegate. I have not identified any concerns.  2) Risk vs benefits of medications reviewed: Yes  3) Life style modifications: sleep hygiene, exercise, healthy diet  4) Medical concerns:    - No acute concerns  5) Other:   -Continue individual therapy > DBT focus  -Recommend ADHD testing > scheduled June 2025, [facility]  6) Refrain from drinking alcohol and/or use of drugs.   7) Please secure all prescription and OTC medications, sharps, and caustic substances. Please remove all firearms and ammunition.  8) Review outside records, get SLOANE's, coordinate with outside providers  - Obtain SLOANE for [facility] in [place]  9) In case of emergency call 911 or go to the nearest ER, this includes patient voicing thought of harming self or others as well as additional safety concerns   10) Follow-up: 6-8 weeks, or sooner if needed.    Any medication(s) require lab monitoring? Yes   LAMOTRIGINE   Last Lamotrigine Level: No results found for: \"LOPEZ\"  Last CMP/LFT Lab Values:   Sodium   Date Value Ref Range Status   08/09/2023 140 136 - 145 mmol/L Final     Potassium   Date Value Ref Range Status   08/09/2023 3.9 3.4 - 5.3 mmol/L Final     Chloride   Date Value Ref Range Status   08/09/2023 102 98 - 107 mmol/L Final     Carbon Dioxide (CO2)   Date Value Ref Range Status   08/09/2023 25 22 - 29 mmol/L Final     Anion Gap   Date Value Ref Range Status   08/09/2023 13 7 - 15 mmol/L Final     Urea Nitrogen   Date Value Ref Range Status   08/09/2023 12.7 6.0 - 20.0 mg/dL Final     Creatinine   Date Value Ref Range Status   08/09/2023 0.63 0.51 - 0.95 mg/dL Final     GFR Estimate   Date Value Ref Range Status   08/09/2023 >90 >60 mL/min/1.73m2 Final     Calcium   Date Value Ref Range " Status   08/09/2023 9.4 8.6 - 10.0 mg/dL Final     Glucose   Date Value Ref Range Status   08/09/2023 82 70 - 99 mg/dL Final     Alkaline Phosphatase   Date Value Ref Range Status   08/09/2023 41 35 - 104 U/L Final     AST   Date Value Ref Range Status   08/09/2023 12 0 - 45 U/L Final     Comment:     Reference intervals for this test were updated on 6/12/2023 to more accurately reflect our healthy population. There may be differences in the flagging of prior results with similar values performed with this method. Interpretation of those prior results can be made in the context of the updated reference intervals.     ALT   Date Value Ref Range Status   08/09/2023 8 0 - 50 U/L Final     Comment:     Reference intervals for this test were updated on 6/12/2023 to more accurately reflect our healthy population. There may be differences in the flagging of prior results with similar values performed with this method. Interpretation of those prior results can be made in the context of the updated reference intervals.       Protein Total   Date Value Ref Range Status   08/09/2023 7.1 6.4 - 8.3 g/dL Final     Albumin   Date Value Ref Range Status   08/09/2023 4.8 3.5 - 5.2 g/dL Final     Bilirubin Total   Date Value Ref Range Status   08/09/2023 0.4 <=1.2 mg/dL Final       Rebecca Patel LPN on 6/5/2025 at 9:21 AM

## 2025-06-08 DIAGNOSIS — F41.1 GAD (GENERALIZED ANXIETY DISORDER): ICD-10-CM

## 2025-06-09 RX ORDER — GABAPENTIN 100 MG/1
100 CAPSULE ORAL 2 TIMES DAILY
Qty: 30 CAPSULE | Refills: 2 | Status: SHIPPED | OUTPATIENT
Start: 2025-06-09

## 2025-06-09 NOTE — TELEPHONE ENCOUNTER
Date of Last Office Visit: 3/26/25  Date of Next Office Visit: 710/25  No shows since last visit: No  More than one patient-initiated cancellation (with reschedule) since last seen in clinic? No    []Medication refilled per  Medication Refill in Ambulatory Care  policy.  []Scope of Practice: refill request processed by LPN/MA  []Medication unable to be refilled by RN due to criteria not met as indicated below:    []Eligibility: has not had a provider visit within last 6 months   []Supervision: no future appointment; < 7 days before next appointment   []Compliance: no shows; cancellations; lapse in therapy   []Verification: order discrepancy; may need modification...   [x] > 30-day supply request   []Advanced refill request: > 7 days before refill date   []Controlled medication   []Medication not included in policy   []Review: new med; med adjusted <= 30 days; safety alert; requires lab monitoring...   []Other:      Medication(s) requested:     -  gabapentin (NEURONTIN) 100 MG capsule   Date last ordered: 3/26/25  Qty: 30  Refills: 2  Appropriate for refill? Provider to review. Request is a bit early however patient will be out before next appt.      Any Controlled Substance(s)? No (but is on )      Requested medication(s) verified as identical to current order? Yes    Any lapse in adherence to medication(s) greater than 5 days? Unknown     Additional action taken? routed encounter to provider for review.      Last visit treatment plan:   1) Medications:   - START lamotrigine 150 mg by mouth daily.  - BuSpar 10 mg, take 2 tablets by mouth twice daily  - Hydroxyzine 25 to 50 mg twice daily as needed for anxiety or sleep  - Trazodone 50mg by mouth at bedtime as needed *Uses if she can take before 730/8p 8:01 AM  - START gabapentin 100 mg by mouth twice daily              MNPMP: I have queried the [place] and/or [place] Prescription Monitoring Program for this patient for the preceding 12 months, or reviewed the report  provided by my proxy delegate. I have not identified any concerns.  2) Risk vs benefits of medications reviewed: Yes  3) Life style modifications: sleep hygiene, exercise, healthy diet  4) Medical concerns:    - No acute concerns  5) Other:   -Continue individual therapy > DBT focus  -Recommend ADHD testing > scheduled June 2025, [facility]  6) Refrain from drinking alcohol and/or use of drugs.   7) Please secure all prescription and OTC medications, sharps, and caustic substances. Please remove all firearms and ammunition.  8) Review outside records, get SLOANE's, coordinate with outside providers  - Obtain SLOANE for [facility] in [place]  9) In case of emergency call 911 or go to the nearest ER, this includes patient voicing thought of harming self or others as well as additional safety concerns   10) Follow-up: 6-8 weeks, or sooner if needed.    Any medication(s) require lab monitoring? No    Rylee Black RN on 6/9/2025 at 2:26 PM

## 2025-06-24 ASSESSMENT — PATIENT HEALTH QUESTIONNAIRE - PHQ9
SUM OF ALL RESPONSES TO PHQ QUESTIONS 1-9: 22
SUM OF ALL RESPONSES TO PHQ QUESTIONS 1-9: 22
10. IF YOU CHECKED OFF ANY PROBLEMS, HOW DIFFICULT HAVE THESE PROBLEMS MADE IT FOR YOU TO DO YOUR WORK, TAKE CARE OF THINGS AT HOME, OR GET ALONG WITH OTHER PEOPLE: VERY DIFFICULT

## 2025-06-24 ASSESSMENT — ANXIETY QUESTIONNAIRES
GAD7 TOTAL SCORE: 18
4. TROUBLE RELAXING: NEARLY EVERY DAY
IF YOU CHECKED OFF ANY PROBLEMS ON THIS QUESTIONNAIRE, HOW DIFFICULT HAVE THESE PROBLEMS MADE IT FOR YOU TO DO YOUR WORK, TAKE CARE OF THINGS AT HOME, OR GET ALONG WITH OTHER PEOPLE: EXTREMELY DIFFICULT
1. FEELING NERVOUS, ANXIOUS, OR ON EDGE: NEARLY EVERY DAY
GAD7 TOTAL SCORE: 18
7. FEELING AFRAID AS IF SOMETHING AWFUL MIGHT HAPPEN: MORE THAN HALF THE DAYS
6. BECOMING EASILY ANNOYED OR IRRITABLE: NEARLY EVERY DAY
GAD7 TOTAL SCORE: 18
2. NOT BEING ABLE TO STOP OR CONTROL WORRYING: NEARLY EVERY DAY
3. WORRYING TOO MUCH ABOUT DIFFERENT THINGS: NEARLY EVERY DAY
5. BEING SO RESTLESS THAT IT IS HARD TO SIT STILL: SEVERAL DAYS
7. FEELING AFRAID AS IF SOMETHING AWFUL MIGHT HAPPEN: MORE THAN HALF THE DAYS
8. IF YOU CHECKED OFF ANY PROBLEMS, HOW DIFFICULT HAVE THESE MADE IT FOR YOU TO DO YOUR WORK, TAKE CARE OF THINGS AT HOME, OR GET ALONG WITH OTHER PEOPLE?: EXTREMELY DIFFICULT

## 2025-06-25 ENCOUNTER — VIRTUAL VISIT (OUTPATIENT)
Dept: PSYCHOLOGY | Facility: CLINIC | Age: 30
End: 2025-06-25
Payer: COMMERCIAL

## 2025-06-25 DIAGNOSIS — F41.1 GENERALIZED ANXIETY DISORDER: Primary | ICD-10-CM

## 2025-06-25 DIAGNOSIS — F33.0 MAJOR DEPRESSIVE DISORDER, RECURRENT, MILD: ICD-10-CM

## 2025-06-25 DIAGNOSIS — Z13.39 ADHD (ATTENTION DEFICIT HYPERACTIVITY DISORDER) EVALUATION: ICD-10-CM

## 2025-06-25 PROCEDURE — 90837 PSYTX W PT 60 MINUTES: CPT | Mod: 95

## 2025-06-25 NOTE — PROGRESS NOTES
[facility]   Mental Health & Addiction Services     Progress Note - Initial Visit    Patient  Name:  Yue Carbone Date: 2025           Service Type: Individual for ADHD Assessment     Visit Start Time: 10:01 am  Visit End Time: 11:05 am    Visit #: ADHD 1    Attendees: Client attended alone    Service Modality:  Video Visit:      Provider verified identity through the following two step process.  Patient provided:  Patient  and Patient address    Telemedicine Visit: The patient's condition can be safely assessed and treated via synchronous audio and visual telemedicine encounter.      Reason for Telemedicine Visit: Patient convenience (e.g. access to timely appointments / distance to available provider)    Originating Site (Patient Location): Patient's home    Distant Site (Provider Location): Provider Remote Setting- Home Office    Consent:  The patient/guardian has verbally consented to: the potential risks and benefits of telemedicine (video visit) versus in person care; bill my insurance or make self-payment for services provided; and responsibility for payment of non-covered services.     Patient would like the video invitation sent by:  My Chart    Mode of Communication:  Video Conference via [facility]    Distant Location (Provider):  Off-site    As the provider I attest to compliance with applicable laws and regulations related to telemedicine.       DATA:   Interactive Complexity: No   Crisis: No   Extended Session (53+ minutes): PROLONGED SERVICE IN THE OUTPATIENT SETTING REQUIRING DIRECT (FACE-TO-FACE) PATIENT CONTACT BEYOND THE USUAL SERVICE:    - Patient's presenting concerns require more intensive intervention than could be completed within the usual service      Presenting Concerns/  Current Stressors:  Patient presents with concerns about attention and concentration. She shared school has always been hard- was hyperactive. Things with her brain have made life hard  "for her. Feels she was able to cope better when she was younger, but after going through some really hard stuff (loss of friends), has been unable to get back on track. Shared \"I dont know how to act anymore\". Has been working with Sadaf Cantu who has encouraged an ADHD evaluation. Wanting to rule out ADHD and Borderline personality. Really wants to go back to school but has reservations due to difficulty with focus and thoughts that she has never been good at school. They reported the following ADHD symptoms: easily distracted, either organized perfectly or chaotic/disorganized (most of the time is disorganized), difficulty with motivation to begin tasks- will occasionally get a burst of energy to do things, difficulty with task completion (one of her biggest concerns), impulsive spending, interrupting, money management, is very forgetful. Has complete gaps in her memory- can forget a night. Also loses things like her phone. Patient reported that the symptoms first began when they were approximately 8 years of age- has heard she got in trouble in [employer] for being hyper.       Patient does report Mental Health Diagnosis and/or Treatment.  Patient reported the following previous diagnoses which include(s): \"no official diagnosis\". Chart review reveals diagnoses of MDD recurrent, generalized anxiety disorder, and a provisional diagnosis of borderline personality disorder.  Patient reported symptoms began early in childhood for depression and anxiety.  Patient has received mental health services in the past:  therapy; psychiatry; urgent care in [place] with no after care. She went after her suicide attempt, but she was unclear if they knew it was a suicide attempt.  Psychiatric Hospitalizations: none. Patient denies a history of civil commitment.      Patient reports current alcohol use to be approximately \"casual\". Has a history of daily drinking, then started blacking out after her friend passed away, then cut " "back totally for 6 months, then slowly started picking it up. She shared variable use throughout the years. Has been more occasional over last month.     Patient reports current cannabis use to be occasional. Will \"smoke a bowl\" before and after seeing family. Uses cannabis most weekends. Can use cannabis when really anxious.     Time in session used to gather information about symptoms.         Explained that patient must be located in the [facility] for virtual appointments.     Explained that this writer is obligated to report allegations of minors or vulnerable adults being abused, neglected or exploited.     Encouraged patient to contact their insurance provider to learn more about their personal cost for the assessment process.       ASSESSMENT:  Mental Status Assessment:  Appearance:   Appropriate   Eye Contact:   Poor  Psychomotor Behavior: Restless   Attitude:   Cooperative   Orientation:   All  Speech   Rate / Production: Talkative   Volume:  Normal   Mood:    Anxious  Depressed   Affect:    Appropriate   Thought Content:  Clear   Thought Form:  Goal Directed   Insight:    Good     Assessments completed prior to this visit:  The following assessments were completed by patient for this visit:  PHQ9:       2/22/2024     3:58 PM 3/6/2024     9:41 AM 5/31/2024     2:46 PM 9/13/2024     1:32 PM 12/30/2024     9:56 AM 3/26/2025     7:07 AM 6/24/2025     8:20 PM   PHQ-9 SCORE   PHQ-9 Total Score MyChart  18 (Moderately severe depression)  18 (Moderately severe depression) 20 (Severe depression) 18 (Moderately severe depression) 22 (Severe depression)   PHQ-9 Total Score 7 18 16 18 20  18  22        Patient-reported     GAD7:       12/21/2019    11:46 AM 11/10/2023     2:49 PM 2/22/2024     3:58 PM 3/6/2024     9:44 AM 8/2/2024     2:35 PM 12/30/2024     9:57 AM 6/24/2025     8:21 PM   BRADLEY-7 SCORE   Total Score 20 (severe anxiety)    15 (severe anxiety) 19 (severe anxiety) 19 (severe anxiety) 18 (severe " anxiety)   Total Score 20 14 18 15 19 19  18        Patient-reported    Proxy-reported     CAGE-AID:       3/6/2024     9:45 AM   CAGE-AID Total Score   Total Score 3   Total Score MyChart 3 (A total score of 2 or greater is considered clinically significant)     PROMIS 10-Global Health (only subscores and total score):       3/6/2024     9:49 AM 8/2/2024     2:37 PM 12/30/2024     9:58 AM 6/24/2025     8:22 PM   PROMIS-10 Scores Only   Global Mental Health Score 7 5 6 6    Global Physical Health Score 11 11 12 11    PROMIS TOTAL - SUBSCORES 18 16 18 17        Patient-reported         Safety Issues and Plan for Safety and Risk Management:   [place] Suicide Severity Rating Scale (Lifetime/Recent)      6/25/2025    10:44 AM   [place] Suicide Severity Rating (Lifetime/Recent)   1. Wish to be Dead (Lifetime) Y   Wish to be Dead Description (Lifetime) SI throughout the years   1. Wish to be Dead (Past 1 Month) N   2. Non-Specific Active Suicidal Thoughts (Lifetime) Y   2. Non-Specific Active Suicidal Thoughts (Past 1 Month) N   3. Active Suicidal Ideation with any Methods (Not Plan) Without Intent to Act (Lifetime) Y   3. Active Suicidal Ideation with any Methods (Not Plan) Without Intent to Act (Past 1 Month) N   4. Active Suicidal Ideation with Some Intent to Act, Without Specific Plan (Lifetime) Y   4. Active Suicidal Ideation with Some Intent to Act, Without Specific Plan (Past 1 Month) N   5. Active Suicidal Ideation with Specific Plan and Intent (Lifetime) N   Most Severe Ideation Rating (Lifetime) 4   Description of Most Severe Ideation (Lifetime) Would be thinking about it all the time as a kid, lots of dark thoughts.   Frequency (Lifetime) 4   Duration (Lifetime) 3   Controllability (Lifetime) 2   Deterrents (Lifetime) 2   Reasons for Ideation (Lifetime) 4   Actual Attempt (Lifetime) Y   Total Number of Actual Attempts (Lifetime) 1   Actual Attempt Description (Lifetime) Took some pills while living in [place]  around age 21.   Actual Attempt (Past 3 Months) N   Has subject engaged in non-suicidal self-injurious behavior? (Lifetime) Y   Has subject engaged in non-suicidal self-injurious behavior? (Past 3 Months) N   Interrupted Attempts (Lifetime) N   Aborted or Self-Interrupted Attempt (Lifetime) N   Preparatory Acts or Behavior (Lifetime) Y   Total Number of Preparatory Acts (Lifetime) 1   Preparatory Acts or Behavior Description (Lifetime) wrote a suicide note a long time ago just to have ready.   Preparatory Acts or Behavior (Past 3 Months) N   Most Recent Attempt Date 1/1/2017   Actual Lethality/Medical Damage Code (Most Recent Attempt) 2   Potential Lethality Code (Most Recent Attempt) 1   Calculated C-SSRS Risk Score (Lifetime/Recent) Moderate Risk     Patient denies current fears or concerns for personal safety.  Patient denies current or recent suicidal ideation or behaviors.  Patient denies current or recent homicidal ideation or behaviors.  Patient denies current or recent self injurious behavior or ideation.  Patient denies other safety concerns.  A safety and risk management plan has been developed including: Patient consented to co-developed safety plan.  Safety and risk management plan was completed - see below.  Patient agreed to use safety plan should any safety concerns arise.  A copy was given to the patient.  Patient reports there are no firearms in the house.     Diagnostic Criteria:  Generalized Anxiety Disorder  A. Excessive anxiety and worry about a number of events or activities (such as work or school performance).   B. The person finds it difficult to control the worry.  C. Select 3 or more symptoms (required for diagnosis). Only one item is required in children.   - Restlessness or feeling keyed up or on edge.    - Being easily fatigued.    - Difficulty concentrating or mind going blank.    - Muscle tension.    - Sleep disturbance (difficulty falling or staying asleep, or restless unsatisfying  sleep).   D. The focus of the anxiety and worry is not confined to features of an Axis I disorder.  E. The anxiety, worry, or physical symptoms cause clinically significant distress or impairment in social, occupational, or other important areas of functioning.   F. The disturbance is not due to the direct physiological effects of a substance (e.g., a drug of abuse, a medication) or a general medical condition (e.g., hyperthyroidism) and does not occur exclusively during a Mood Disorder, a Psychotic Disorder, or a Pervasive Developmental Disorder.    Major Depressive Disorder  CRITERIA (A-C) REPRESENT A MAJOR DEPRESSIVE EPISODE - SELECT THESE CRITERIA  A) Recurrent episode(s) - symptoms have been present during the same 2-week period and represent a change from previous functioning 5 or more symptoms (required for diagnosis)   - Depressed mood. Note: In children and adolescents, can be irritable mood.     - Increased sleep.    - Fatigue or loss of energy.    - Feelings of worthlessness or inappropriate and excessive guilt.    - Diminished ability to think or concentrate, or indecisiveness.   B) The symptoms cause clinically significant distress or impairment in social, occupational, or other important areas of functioning  C) The episode is not attributable to the physiological effects of a substance or to another medical condition  D) The occurence of major depressive episode is not better explained by other thought / psychotic disorders  E) There has never been a manic episode or hypomanic episode      DSM5 Diagnoses: (Sustained by DSM5 Criteria Listed Above)  Diagnoses: 296.31 (F33.0) Major Depressive Disorder, Recurrent Episode, Mild _  300.02 (F41.1) Generalized Anxiety Disorder    Psychosocial & Contextual Factors: employed, limited social/family support  Intervention:   Empathy, open ended questions and reflections used throughout session. Oriented patient to ADHD process and provided some psychoeducation on  assessing ADHD in adulthood. Validated experiences. Assessed for safety.  Created safety plan  Collateral Reports Completed:  Routed note to Care Team Member(s) (Psychiatrist, Sadaf Cantu, MALLORIE COREY)      PLAN: (Homework, other):  1. Provider will continue Diagnostic Assessment.  Patient was given the following to do until next session:  Patient was informed that she will be emailed the self and collaborative rating scales to be completed. Depression and anxiety rating scales were completed.  Copies of previous report cards were NOT requested. Patient provided consent to email the questionnaires to [email or web address]. Patient also plans to complete the  MMPI-3. CNS-VS will tentatively be sent July 3rd.     2. Provider recommended the following referrals: none.      3.  Suicide Risk and Safety Concerns were assessed for Yue Carbone.    Patient meets the following risk assessment and triage:   Moderate Risk is identified when the patient has one of the following:  Suicidal behavior more than three months ago ( C-SSRS Suicidal Behavior Lifetime)    The following has been recommended:  Complete/Review/Update Safety Plan    Safety Plan:  Rubén Safety Plan      Creation Date: 6/25/25 Last Update Date: 6/25/25      Step 1: Warning signs:    Warning Signs    Not wanting to cook    Place getting messier    Not wanting to sing      Step 2: Internal coping strategies - Things I can do to take my mind off my problems without contacting another person:    Strategies    Hang out with Elva    Try to bake    Try and organize something    Get out of my place and go do something      Step 3: People and social settings that provide distraction:    Name Contact Information    Brother- Zeyad Number in phone    Guero Number in phone    Ramila number in phone       Places    Take a drive around [facility]      Step 4: People whom I can ask for help during a crisis:    Name Contact Information    Guero (friend) number in phone       Step 5: Professionals or agencies I can contact during a crisis:    Clinician/Agency Name Phone Emergency Contact    Yasmin: [facility]        Local Emergency Department Emergency Department Address Emergency Department Phone    [place] [address] [address] [place] [address]       Suicide Prevention Lifeline Phone: Call or Text 981  Crisis Text Line: Text HOME to 112401     Step 6: Making the environment safer (plan for lethal means safety):   Did not identify any lethal methods     Optional: What is most important to me and worth living for?:   Desire to be happy and have a better future.      Rubén Safety Plan. Pat Ulloa and Jean Bashir. Used with permission of the authors.             Vicky Johnson Psy.D, Postdoctoral Fellow, June 25, 2025

## 2025-06-25 NOTE — Clinical Note
Thank you for the referral.  I saw the patient today for the first appointment in the ADHD Evaluation and she was diagnosed with MDD and BRADLEY. I will assess for ADHD and route the final diagnoses and recommendations to you when the evaluation is completed.   Please let me know if you have any questions.   Vicky Johnson Psy.D, Postdoctoral Fellow, June 25, 2025

## 2025-07-02 ENCOUNTER — VIRTUAL VISIT (OUTPATIENT)
Dept: PSYCHOLOGY | Facility: CLINIC | Age: 30
End: 2025-07-02
Payer: COMMERCIAL

## 2025-07-02 DIAGNOSIS — F41.1 GENERALIZED ANXIETY DISORDER: Primary | ICD-10-CM

## 2025-07-02 DIAGNOSIS — Z13.39 ADHD (ATTENTION DEFICIT HYPERACTIVITY DISORDER) EVALUATION: ICD-10-CM

## 2025-07-02 DIAGNOSIS — F33.0 MAJOR DEPRESSIVE DISORDER, RECURRENT, MILD: ICD-10-CM

## 2025-07-02 PROCEDURE — 90837 PSYTX W PT 60 MINUTES: CPT | Mod: 95
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
## 2025-07-10 ENCOUNTER — VIRTUAL VISIT (OUTPATIENT)
Dept: PSYCHIATRY | Facility: CLINIC | Age: 30
End: 2025-07-10
Payer: COMMERCIAL

## 2025-07-10 ENCOUNTER — TELEPHONE (OUTPATIENT)
Dept: PSYCHOLOGY | Facility: CLINIC | Age: 30
End: 2025-07-10
Payer: COMMERCIAL

## 2025-07-10 DIAGNOSIS — F41.1 GAD (GENERALIZED ANXIETY DISORDER): ICD-10-CM

## 2025-07-10 DIAGNOSIS — F60.3 BORDERLINE PERSONALITY DISORDER (H): ICD-10-CM

## 2025-07-10 RX ORDER — BUSPIRONE HYDROCHLORIDE 10 MG/1
20 TABLET ORAL 2 TIMES DAILY
Qty: 120 TABLET | Refills: 1 | Status: SHIPPED | OUTPATIENT
Start: 2025-07-10

## 2025-07-10 RX ORDER — GABAPENTIN 100 MG/1
200 CAPSULE ORAL 2 TIMES DAILY
Qty: 120 CAPSULE | Refills: 2 | Status: SHIPPED | OUTPATIENT
Start: 2025-07-10

## 2025-07-10 RX ORDER — LAMOTRIGINE 200 MG/1
200 TABLET ORAL DAILY
Qty: 30 TABLET | Refills: 2 | Status: SHIPPED | OUTPATIENT
Start: 2025-07-10

## 2025-07-10 RX ORDER — HYDROXYZINE HYDROCHLORIDE 25 MG/1
TABLET, FILM COATED ORAL
Qty: 120 TABLET | Refills: 1 | Status: SHIPPED | OUTPATIENT
Start: 2025-07-10

## 2025-07-10 ASSESSMENT — PAIN SCALES - GENERAL: PAINLEVEL_OUTOF10: MODERATE PAIN (4)

## 2025-07-10 ASSESSMENT — PATIENT HEALTH QUESTIONNAIRE - PHQ9
SUM OF ALL RESPONSES TO PHQ QUESTIONS 1-9: 27
10. IF YOU CHECKED OFF ANY PROBLEMS, HOW DIFFICULT HAVE THESE PROBLEMS MADE IT FOR YOU TO DO YOUR WORK, TAKE CARE OF THINGS AT HOME, OR GET ALONG WITH OTHER PEOPLE: EXTREMELY DIFFICULT
SUM OF ALL RESPONSES TO PHQ QUESTIONS 1-9: 27
10. IF YOU CHECKED OFF ANY PROBLEMS, HOW DIFFICULT HAVE THESE PROBLEMS MADE IT FOR YOU TO DO YOUR WORK, TAKE CARE OF THINGS AT HOME, OR GET ALONG WITH OTHER PEOPLE: EXTREMELY DIFFICULT

## 2025-07-10 ASSESSMENT — ANXIETY QUESTIONNAIRES
7. FEELING AFRAID AS IF SOMETHING AWFUL MIGHT HAPPEN: NEARLY EVERY DAY
2. NOT BEING ABLE TO STOP OR CONTROL WORRYING: NEARLY EVERY DAY
GAD7 TOTAL SCORE: 21
8. IF YOU CHECKED OFF ANY PROBLEMS, HOW DIFFICULT HAVE THESE MADE IT FOR YOU TO DO YOUR WORK, TAKE CARE OF THINGS AT HOME, OR GET ALONG WITH OTHER PEOPLE?: EXTREMELY DIFFICULT
GAD7 TOTAL SCORE: 21
IF YOU CHECKED OFF ANY PROBLEMS ON THIS QUESTIONNAIRE, HOW DIFFICULT HAVE THESE PROBLEMS MADE IT FOR YOU TO DO YOUR WORK, TAKE CARE OF THINGS AT HOME, OR GET ALONG WITH OTHER PEOPLE: EXTREMELY DIFFICULT
GAD7 TOTAL SCORE: 21
7. FEELING AFRAID AS IF SOMETHING AWFUL MIGHT HAPPEN: NEARLY EVERY DAY
1. FEELING NERVOUS, ANXIOUS, OR ON EDGE: NEARLY EVERY DAY
4. TROUBLE RELAXING: NEARLY EVERY DAY
3. WORRYING TOO MUCH ABOUT DIFFERENT THINGS: NEARLY EVERY DAY
6. BECOMING EASILY ANNOYED OR IRRITABLE: NEARLY EVERY DAY
5. BEING SO RESTLESS THAT IT IS HARD TO SIT STILL: NEARLY EVERY DAY

## 2025-07-10 NOTE — PATIENT INSTRUCTIONS
"Patient Education   The Panel Psychiatry Program  What to Expect  Here's what to expect in the Panel Psychiatry Program.   About the program  You'll be meeting with a psychiatric doctor to check your mental health. A psychiatric doctor helps you deal with troubling thoughts and feelings by giving you medicine. They'll make sure you know the plan for your care. You may see them for a long time. When you're feeling better, they may refer you back to seeing your family doctor.   If you have any questions, we'll be glad to talk to you.  About visits  Be open  At your visits, please talk openly about your problems. It may feel hard, but it's the best way for us to help you.  Cancelling visits  If you can't come to your visit, please call us right away at [phone]. If you don't cancel at least 24 hours (1 full day) before your visit, that's \"late cancellation.\"  Not showing up for your visits  Being very late is the same as not showing up. You'll be a \"no show\" if:  You're more than 15 minutes late for a 30-minute (half hour) visit.  You're more than 30 minutes late for a 60-minute (full hour) visit.  If you cancel late or don't show up 2 times within 6 months, we may end your care.  Getting help between visits  If you need help between visits, you can call us Monday to Friday from 8 a.m. to 4:30 p.m. at [phone].  Emergency care  Call 911 or go to the nearest emergency department if your life or someone else's life is in danger.  Call 988 anytime to reach the national Suicide and Crisis hotline.  Medicine refills  To refill your medicine, call your pharmacy. You can also call [facility]'s [facility] at [phone], Monday to Friday, 8 a.m. to 4:30 p.m. It can take 1 to 3 business days to get a refill.   Forms, letters, and tests  You may have papers to fill out, like FMLA, short-term disability, and workability. We can help you with these forms at your visits, but you must have an " appointment. You may need more than 1 visit for this, to be in an intensive therapy program, or both.  Before we can give you medicine for ADHD, we may refer you to get tested for it or confirm it another way.  We may not be able to give you an emotional support animal letter.  We don't do mental health checks ordered by the court.   We don't do mental health testing, but we can refer you to get tested.   Thank you for choosing us for your care.  For informational purposes only. Not to replace the advice of your health care provider. Copyright   2022 [facility]. All rights reserved. [employer] 061124 - Rev 11/24.

## 2025-07-10 NOTE — PROGRESS NOTES
"    Virtual Visit Details     Type of service:  Video Visit     Originating Location (pt. Location): Home    Distant Location (provider location): on site  Platform used for Video Visit: [email or web address]          OUTPATIENT PSYCHIATRIC FOLLOW-UP      7/10/2025     Provider: MALLORIE Salter CNP      Appointment Start Time: 1234  Appointment End Time: 1252  Name: Yue Carbone   : 1995                    Preferred Name: Yue      Screening Tools           7/10/2025    12:24 PM 2025     8:20 PM 3/26/2025     7:07 AM   PHQ   PHQ-9 Total Score 27  22  18    Q9: Thoughts of better off dead/self-harm past 2 weeks Nearly every day Not at all Not at all   F/U: Thoughts of suicide or self-harm No     F/U: Safety concerns No         Patient-reported         7/10/2025    12:24 PM 2025     8:21 PM 2024     9:57 AM   BRADLEY-7 SCORE   Total Score 21 (severe anxiety) 18 (severe anxiety) 19 (severe anxiety)   Total Score 21  18  19        Patient-reported     PROMIS 10-Global Health (only subscores and total score):       3/6/2024     9:49 AM 2024     2:37 PM 2024     9:58 AM 2025     8:22 PM 7/10/2025    12:25 PM   PROMIS-10 Scores Only   Global Mental Health Score 7 5 6 6  6    Global Physical Health Score 11 11 12 11  10    PROMIS TOTAL - SUBSCORES 18 16 18 17  16        Patient-reported          History of Present Illness      Patient attended the session alone.     Interim History:  I last saw Yue Riverayne Raf for outpatient psychiatry follow-up on 25. During that appointment, we advanced gabapentin 100mg BID and advanced lamictal to 150mg    Current stressors include: Symptoms and Relationship Difficulties     Coping mechanisms and supports include: Therapy, Hobbies, and Friends    Side effects: Denies     Medication adherence: Reports good med adherence.    Psychiatric Review of Systems      Yue Carbone reports mood has been: \"Okay\"    Several stressors ongoing but worsened in " "June:   - Vacation followed by work trip   - Finances   - Several family visits.  - Great-aunt passed.     Depression has been:   - Less moving, \"lay and do nothing\".   - Mood lower  - Isolating more; feels she values friendships differently then her friends.   - Mood lability  - Had felt medications were keeping her level.       Anxiety has been:   - \"Still a factor\".  - Notes improvements still.   - Still more anxious in social settings.     Sleep has been:   - Stable.   - Trying to stick to routine.   - Bed around 10p/1030, sleep around 1-2 a.   - Has been oversleeping.     Radha sxs: Denies    Psychosis sxs: Denies    ADHD sxs:   - In process of ADHD testing.   - No THC with this process.   - Procrastinated tests, family visits.   - Symptoms remain similar.     PTSD sxs: Denies    SI/SIB: Denies suicidal ideation. Denies plan or intent.Denies SIB urges. Denies HI.       Medications Prior to Appointment       Current Outpatient Medications   Medication Sig Dispense Refill    busPIRone (BUSPAR) 10 MG tablet Take 2 tablets (20 mg) by mouth 2 times daily. 120 tablet 1    clindamycin (CLEOCIN T) 1 % external lotion APPLY TOPICALLY TO THE AFFECTED AREA EVERY MORNING      doxycycline monohydrate (MONODOX) 100 MG capsule TAKE 1 CAPSULE BY MOUTH TWICE DAILY FOR 2 MONTHS WITH FOOD ALREADY IN THE STOMACH      gabapentin (NEURONTIN) 100 MG capsule TAKE 1 CAPSULE(100 MG) BY MOUTH TWICE DAILY 30 capsule 2    hydrOXYzine HCl (ATARAX) 25 MG tablet TAKE 1 TO 2 TABLETS BY MOUTH TWICE DAILY AS NEEDED FOR ANXIETY 120 tablet 1    lamoTRIgine (LAMICTAL) 150 MG tablet TAKE 1 TABLET(150 MG) BY MOUTH DAILY 30 tablet 1    metroNIDAZOLE (METROCREAM) 0.75 % external cream APPLY TOPICALLY TO THE AFFECTED AREA EVERY NIGHT AT BEDTIME      spironolactone (ALDACTONE) 100 MG tablet Take 100 mg by mouth At Bedtime      traZODone (DESYREL) 50 MG tablet Take 0.5-2 tablets ( mg) by mouth nightly as needed for sleep. 30 tablet 2    " "      Previous medication trials include but not limited to:  -Abilify  -Citalopram  -Pristiq  -Depakote  -Hydroxyzine  -Lorazepam  -Xanax                 Medication Compliance: \"Miss some\"                 Pharmacogenomic Testing Completed: No         Medical History      History of head injuries: Yes. Concussions. Sports related   History of seizures: No  History of cardiac events: No  History of Tardive Dyskinesia: No     - Hx of syncope episodes.          Past Medical History:   Diagnosis Date    Known health problems: none 02/10/2023        Surgery:   Past Surgical History:   Procedure Laterality Date    NO HISTORY OF SURGERY  02/10/2023     Primary Care Provider: Naz Catherine MD        Social History      Current Living situation: [facility] with self.    Current use of drugs or alcohol:   - Alcohol. Binge drinking 3 weekends (1 day each weekend)  - THC. Evening, only. More often on weekends (3 times per day) . Currently abstaining.     Tobacco use: Vaping    Employment: Yes.  Full-time dermatology clinic     Relationship Status: Single    Vitals      Not obtained d/t virtual visit.     There were no vitals taken for this visit.    Labs        Most recent laboratory results reviewed and pertinent results include:   Lab on 03/08/2024   Component Date Value Ref Range Status    hCG Urine Qualitative 03/08/2024 Negative  Negative Final    This test is for screening purposes.  Results should be interpreted along with the clinical picture.  Confirmation testing is available if warranted by ordering [id number], HCG Quantitative Pregnancy.   Office Visit on 03/08/2024   Component Date Value Ref Range Status    Neisseria gonorrhoeae 03/08/2024 Negative  Negative Final    Negative for N. gonorrhoeae rRNA by transcription mediated amplification. A negative result by transcription mediated amplification does not preclude the presence of C. trachomatis infection because results are dependent on proper and adequate " collection, absence of inhibitors and sufficient rRNA to be detected.    Chlamydia trachomatis 03/08/2024 Negative  Negative Final    A negative result by transcription mediated amplification does not preclude the presence of C. trachomatis infection because results are dependent on proper and adequate collection, absence of inhibitors and sufficient rRNA to be detected.   Office Visit on 02/22/2024   Component Date Value Ref Range Status    hCG Urine Qualitative 02/22/2024 Negative  Negative Final    This test is for screening purposes.  Results should be interpreted along with the clinical picture.  Confirmation testing is available if warranted by ordering [id number], HCG Quantitative Pregnancy.   Office Visit on 11/10/2023   Component Date Value Ref Range Status    Chlamydia trachomatis 11/10/2023 Negative  Negative Final    A negative result by transcription mediated amplification does not preclude the presence of C. trachomatis infection because results are dependent on proper and adequate collection, absence of inhibitors and sufficient rRNA to be detected.    Trichomonas 11/10/2023 Absent  Absent Final    Yeast 11/10/2023 Absent  Absent Final    Clue Cells 11/10/2023 Absent  Absent Final    WBCs/high power field 11/10/2023 2+ (A)  None Final    Interpretation 11/10/2023 Negative for Intraepithelial Lesion or Malignancy (NILM)    Final    Comment 11/10/2023    Final                    Value:This result contains rich text formatting which cannot be displayed here.    Specimen Adequacy 11/10/2023 Satisfactory for evaluation, endocervical/transformation zone component absent   Final    Clinical Information 11/10/2023    Final                    Value:This result contains rich text formatting which cannot be displayed here.    LMP/Menopause Date 11/10/2023    Final                    Value:This result contains rich text formatting which cannot be displayed here.    Reflex Testing 11/10/2023 Yes if ASCUS   Final     "Previous Abnormal? 11/10/2023    Final                    Value:This result contains rich text formatting which cannot be displayed here.    Performing Labs 11/10/2023    Final                    Value:This result contains rich text formatting which cannot be displayed here.    Other HR HPV 11/10/2023 Negative  Negative Final    HPV16 DNA 11/10/2023 Negative  Negative Final    HPV18 DNA 11/10/2023 Negative  Negative Final    FINAL DIAGNOSIS 11/10/2023    Final                    Value:This result contains rich text formatting which cannot be displayed here.     Most recent EKG from 8/9/2023 reviewed. QTc interval 427.        Medical Review of Systems      Pertinent positives noted in HPI and below:   Review of Systems   All other systems reviewed and are negative.       IUD No LMP recorded.    Pregnant / Breastfeeding: No       Mental Status Exam      Appearance: awake, alert, adequately groomed, appeared stated age and no apparent distress  Attitude:  cooperative   Eye Contact:  good  Gait and Station: normal, no gross abnormalities noted by observation  Psychomotor Behavior:  no evidence of tardive dyskinesia, dystonia, or tics  Oriented to:  person, place, time, and situation  Attention Span and Concentration:  normal  Speech:  clear, coherent, regular rate, rhythm, and volume  Language: intact  Mood: \"Down\"  Affect:  appropriate and in normal range  Associations:  no loose associations  Thought Process:  logical, linear and goal oriented  Thought Content:  no evidence of suicidal ideation or homicidal ideation, no evidence of psychotic thought, no auditory hallucinations present and no visual hallucinations present  Recent and Remote Memory:  Intact to interview. Not formally assessed. No amnesia.  Fund of Knowledge: appropriate  Insight:  partial>full  Judgment:  intact, adequate for safety  Impulse Control:  intact         Risk Assessment         Suicide assessment  Acute: Low  Chronic:Low  Imminent: Low   "   Risk factors  History of suicide attempts: Yes  History of self-injurious behavior: Yes  Amrit. Axis I psychiatric diagnoses: Yes  Substance use disorder: Yes  Symptoms: anhedonia, impulsivity, insomnia  Family history of completed suicide or attempted suicide: Yes, possible  Accessibility to firearms: No  Interpersonal factors: Relationship challenges, family dynamic challenges     Protective factors  Ability to cope with the stress: Yes  Family responsibility and supportive:Yes  Positive therapeutic relationships: Yes  Social support:Yes  Worship beliefs:  No  Connectedness with mental health providers: Yes     Homicidal Risk  Acute: Low  Chronic: Low  Imminent: Low    Assessment     Yue Carbone has a past psychiatry history of MDD, BRADLEY, bipolar disorder who has been referred for medication management from primary care provider.  No previous psychiatric inpatient hospitalizations.  1 previous suicide attempt via ingestion around age 20 or 21 years old.  Suicidal ideation that began at a young age that has been passive in nature.  No longer experiencing suicidal ideation since the loss of her friend in 2021.  Knows the impact suicide can have on individuals.  If suicidal ideation were to occur thoughts are passive and fleeting.  History of self harming behaviors of cutting.  Chemical health history significant for experimentation.  Frequent use of cannabis.  Trauma history disclosed including the loss of her friend to fentanyl overdose in 2021.     Yue Carbone reports lamotrigine continued to be beneficial.  Finds her mood has been even overall.  However mood has been more down in the last month.  In agreement to advance to 200 mg.  We will also advance gabapentin to further target anxiety given she is taking hydroxyzine quite routinely.  In process of ADHD testing.  Third session scheduled for tomorrow.  Still has to complete testing and family survey before then.  No imminent safety concerns.  Refraining  from THC currently.  Patient to follow up once ADHD testing completed.    Pharmacologic:   3/6/2024: + Wellbutrin , buspirone 7.5 mg twice daily (insurance would not cover 7.5 mg tablets)  4/12/2024: + Wellbutrin  mg; advance buspirone 15 mg twice daily  05/31/24: stop wellbutrin XL 150mg (taper); + fluoxetine   8/2/2024: Stop fluoxetine, start duloxetine   8/20/2024: Patient stopped duloxetine due to increased anxiety which resolved  9/13/24: + lamictal titration   12/30/2024: + Lamictal 100 mg, + gabapentin 100 mg  03/26/25: + Lamictal 150mg , + gabapentin 100mg BID    - START lamotrigine 200 mg by mouth daily.  - BuSpar 10 mg, take 2 tablets by mouth twice daily  - Hydroxyzine 25 to 50 mg twice daily as needed for anxiety or sleep  - Trazodone 50mg by mouth at bedtime as needed *Uses if she can take before 730/8p 8:01 AM  - START gabapentin 100 mg, take 2 capsules by mouth twice daily    Psychosocial: Would benefit from individual therapy with focus of Dialectical Behavior Therapy (DBT). DBT would be helpful in addressing emotional regulation, distress tolerance, mindfulness, and interpersonal effectiveness.    -Continue individual therapy           Diagnosis       Borderline personality disorder (provisional diagnosis)  MDD, recurrent, mild to moderate  BRADLEY        Rule out cannabis use disorder  Rule out ADHD*    Plan         1) Medications:   - START lamotrigine 200 mg by mouth daily.  - BuSpar 10 mg, take 2 tablets by mouth twice daily  - Hydroxyzine 25 to 50 mg twice daily as needed for anxiety or sleep  - Trazodone 50mg by mouth at bedtime as needed *Uses if she can take before 730/8p 8:01 AM  - START gabapentin 100 mg, take 2 capsules by mouth twice daily              MNPMP: I have queried the [place] and/or [place] Prescription Monitoring Program for this patient for the preceding 12 months, or reviewed the report provided by my proxy delegate. I have not identified any concerns.  2) Risk vs benefits of  medications reviewed: Yes  3) Life style modifications: sleep hygiene, exercise, healthy diet  4) Medical concerns:    - No acute concerns  5) Other:   -Continue individual therapy > DBT focus  -Recommend ADHD testing > scheduled June 2025, [facility]  6) Refrain from drinking alcohol and/or use of drugs.   7) Please secure all prescription and OTC medications, sharps, and caustic substances. Please remove all firearms and ammunition.  8) Review outside records, get SLOANE's, coordinate with outside providers  - Obtain SLOANE for [facility] in [place]  9) In case of emergency call 911 or go to the nearest ER, this includes patient voicing thought of harming self or others as well as additional safety concerns   10) Follow-up: 6-8 weeks, or sooner if needed.      Administrative Billing:   Supportive therapy was provided, focusing on reflective listening and solution focused problem solving.    Total time preparing to see this patient, face-to-face time, documenting in the EHR, and coordinating care time on the same calendar date: 26 minutes    The longitudinal plan of care for the diagnosis(es)/condition(s) as documented were addressed during this visit. Due to the added complexity in care, I will continue to support Yue in the subsequent management and with ongoing continuity of care.      Signed:   MALLORIE Salter CNP on 7/10/2025 at 12:55 PM     Disclaimer: This note consists of symbols derived from keyboarding, dictation and/or voice recognition software. As a result, there may be errors in the script that have gone undetected. Please consider this when interpreting information found in this chart.          Answers submitted by the patient for this visit:  Patient Health Questionnaire (Submitted on 7/10/2025)  If you checked off any problems, how difficult have these problems made it for you to do your work, take care of things at home, or get along with other people?: Extremely difficult  PHQ9 TOTAL  SCORE: 27  Patient Health Questionnaire (G7) (Submitted on 7/10/2025)  BRADLEY 7 TOTAL SCORE: 21

## 2025-07-10 NOTE — NURSING NOTE
Current patient location: Patient declined to provide     Is the patient currently in the state of [place]? YES    Visit mode: VIDEO    If the visit is dropped, the patient can be reconnected by:VIDEO VISIT: Text to cell phone:   Telephone Information:   Mobile [phone]       Will anyone else be joining the visit? NO  (If patient encounters technical issues they should call [phone]    Are changes needed to the allergy or medication list? No    Are refills needed on medications prescribed by this physician? Discuss with provider    Rooming Documentation:  Questionnaire(s) completed    Reason for visit: RECHMELISSA IQBAL

## 2025-07-11 ENCOUNTER — VIRTUAL VISIT (OUTPATIENT)
Dept: PSYCHOLOGY | Facility: CLINIC | Age: 30
End: 2025-07-11
Payer: COMMERCIAL

## 2025-07-11 DIAGNOSIS — F41.1 GENERALIZED ANXIETY DISORDER: ICD-10-CM

## 2025-07-11 DIAGNOSIS — F43.9 TRAUMA AND STRESSOR-RELATED DISORDER: ICD-10-CM

## 2025-07-11 DIAGNOSIS — Z13.39 ADHD (ATTENTION DEFICIT HYPERACTIVITY DISORDER) EVALUATION: ICD-10-CM

## 2025-07-11 DIAGNOSIS — F33.0 MAJOR DEPRESSIVE DISORDER, RECURRENT, MILD: Primary | ICD-10-CM

## 2025-07-11 DIAGNOSIS — F41.0 PANIC DISORDER: ICD-10-CM

## 2025-07-11 PROCEDURE — 90791 PSYCH DIAGNOSTIC EVALUATION: CPT | Mod: 95

## 2025-07-11 NOTE — PROGRESS NOTES
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 Criteria:   Generalized Anxiety Disorder  A. Excessive anxiety and worry about a number of events or activities (such as work or school performance).   B. The person finds it difficult to control the worry.  C. Select 3 or more symptoms (required for diagnosis). Only one item is required in children.   - Restlessness or feeling keyed up or on edge.    - Being easily fatigued.    - Difficulty concentrating or mind going blank.    - Muscle tension.    - Sleep disturbance (difficulty falling or staying asleep, or restless unsatisfying sleep).   D. The focus of the anxiety and worry is not confined to features of an Axis I disorder.  E. The anxiety, worry, or physical symptoms cause clinically significant distress or impairment in social, occupational, or other important areas of functioning.   F. The disturbance is not due to the direct physiological effects of a substance (e.g., a drug of abuse, a medication) or a general medical condition (e.g., hyperthyroidism) and does not occur exclusively during a Mood Disorder, a Psychotic Disorder, or a Pervasive Developmental Disorder.     Major Depressive Disorder  CRITERIA (A-C) REPRESENT A MAJOR DEPRESSIVE EPISODE - SELECT THESE CRITERIA  A) Recurrent episode(s) - symptoms have been  present during the same 2-week period and represent a change from previous functioning 5 or more symptoms (required for diagnosis)   - Depressed mood. Note: In children and adolescents, can be irritable mood.     - Increased sleep.    - Fatigue or loss of energy.    - Feelings of worthlessness or inappropriate and excessive guilt.    - Diminished ability to think or concentrate, or indecisiveness.   B) The symptoms cause clinically significant distress or impairment in social, occupational, or other important areas of functioning  C) The episode is not attributable to the physiological effects of a substance or to another medical condition  D) The occurence of major depressive episode is not better explained by other thought / psychotic disorders  E) There has never been a manic episode or hypomanic episode       Panic Disorder, A.Recurrent unexpected panic attacks. A panic attack is an abrupt surge of intense fear or intense discomfort that reaches a peak within minutes, and during which time four (or more) of the following symptoms occur: Chill / hot flashes, Increased heart rate/ Palpitations, Shortness of breath, Sweating, and Trembling / shaking, B.At least one of the attacks has been followed by 1 month (or more) of Persistent concern or worry about additional panic attacks or their consequences, C.The disturbance is not attributable to the physiological effects of a substance (e.g., a drug of abuse, a medication) or another medical condition (e.g., hyperthyroidism, cardiopulmonary disorders)., and D.The disturbance is not better explained by another mental disorder      Unspecified Trauma- and Stressor-Related Disorder, Symptoms characteristic of a trauma and stressor related disorder that cause clinically significant distress or impairment in social, occupational, or other important areas of functioning predominate but do not meet the full criteria for any of the disorders in the trauma and stressor related  disorders diagnostic class.    *Patient reporting multiple symptoms of PTSD but does not meet criterion A for a full diagnosis.     Functional Status:  Patient reports the following functional impairments:  academic performance, chronic disease management, educational activities, health maintenance, management of the household and or completion of tasks, money management, organization, relationships, self care, social interaction, work or vocational responsibilities.     Nonprogrammatic care:  Patient is requesting basic services to address current mental health concerns.    Clinical Summary:  1. Psychosocial Factors:  Trauma history, Substance use history/concerns, Interpersonal concerns.  Cultural and Contextual Factors: employed, limited social relationships, in therapy  2. Principal DSM5 Diagnoses  (Sustained by DSM5 Criteria Listed Above):   296.32 (F33.1) Major Depressive Disorder, Recurrent Episode, Moderate _.  3. Other Diagnoses that is relevant to services:   300.01 (F41.0) Panic Disorder  300.02 (F41.1) Generalized Anxiety Disorder  309.9 (F43.9) Unspecified Trauma and Stressor Related Disorder.  4. Per patient history, a provisional Diagnosis of  301.83 (F60.3) Borderline Personality Disorder. Full diagnosis is outside of the scope of this evaluation and will be deferred to her longstanding therapist.   5. Prognosis: Expect Improvement.  6. Likely consequences of symptoms if not treated: ongoing difficulty managing mental health symptoms, with risk of symptoms becoming more severe which will lead to greater impact on quality of life and functioning.   7. Patient strengths include:  employed,motivated, open to learning .     Recommendations:     1. Plan for Safety and Risk Management:   Safety and Risk: A safety and risk management plan has been developed including: Patient consented to co-developed safety plan.  Safety and risk management plan was completed - see below.  Patient agreed to use safety plan  should any safety concerns arise.  A copy was given to the patient.        Report to child / adult protection services was NA.     2. Patient identified no cultural or Religion considerations for treatment.      3. Initial Treatment will focus on:    ADHD Testing:  Patient to retry the CNS-VS after invalid test results from attempt 1. Patient to resend the collateral report with all pages included.     4. Resources/Service Plan:    services are not indicated.   Modifications to assist communication are not indicated.   Additional disability accommodations are not indicated.      5. Collaboration:   Collaboration / coordination of treatment will be initiated with the following support professionals: psychiatry and PCP.      6.  Referrals:   The following referral(s) will be initiated: none.       A Release of Information has been obtained for the following: none.     Clinical Substantiation/medical necessity for the above recommendations:  Psychological testing is required for diagnostic clarification. Clinical interview was not sufficient to determine if patient meets criteria for ADHD.     7. SELENA:    SELENA:  Discussed the general effects of drugs and alcohol on health and well-being. Recommendations:  consider continuing to abstain from cannabis long term, or reduce frequency of use if she decides to start using again. Continue to limit alcohol use. Patient was open to recommendations.      8. Records:   These were reviewed at time of assessment.   Information in this assessment was obtained from the medical record and  provided by patient who is a good historian.    Patient will have open access to their mental health medical record.    9.   Interactive Complexity: No    10. Safety Plan:   [employer] Safety Plan      Creation Date: 6/25/25 Last Update Date: 6/25/25      Step 1: Warning signs:    Warning Signs    Not wanting to cook    Place getting messier    Not wanting to sing      Step 2: Internal  coping strategies - Things I can do to take my mind off my problems without contacting another person:    Strategies    Hang out with Elva    Try to bake    Try and organize something    Get out of my place and go do something      Step 3: People and social settings that provide distraction:    Name Contact Information    BrotherDanielito Jeffers Number in phone    Guero Number in phone    Ramila number in phone       Places    Take a drive around [place]      Step 4: People whom I can ask for help during a crisis:    Name Contact Information    Guero (friend) number in phone      Step 5: Professionals or agencies I can contact during a crisis:    Clinician/Agency Name Phone Emergency Contact    Yasmin: [facility]        [facility] Emergency Department Emergency Department Address Emergency Department Phone    [facility] [address] [place] [address]       Suicide Prevention Lifeline Phone: Call or Text 608  Crisis Text Line: Text HOME to 512451     Step 6: Making the environment safer (plan for lethal means safety):   Did not identify any lethal methods     Optional: What is most important to me and worth living for?:   Desire to be happy and have a better future.      [employer] Safety Plan. Pat Ulloa and Jean Bashir. Used with permission of the authors.           Vicky Johnson Psy.D, Postdoctoral Fellow,  July 11, 2025

## 2025-07-11 NOTE — TELEPHONE ENCOUNTER
General Call      Reason for Call:  Code to access testing has . Please at your earliest convenience supply a new code, before 10 a.m. if possible, so that there is time to complete before the visit tomorrow.    Patient tried to reach out through [employer] but was unable to find provider's contact information.    Date of last appointment with provider: 25 @ noon    Could we send this information to you in [employer] or would you prefer to receive a phone call?:   Patient would prefer a phone call   Okay to leave a detailed message?: Yes at Cell number on file:    Telephone Information:   Mobile [phone]

## 2025-07-11 NOTE — Clinical Note
ADHD DA for review. I have been in contact with her psychiatrist. Can discuss more in supervision. Thanks! Vicky